# Patient Record
Sex: FEMALE | Race: BLACK OR AFRICAN AMERICAN | NOT HISPANIC OR LATINO | Employment: OTHER | URBAN - METROPOLITAN AREA
[De-identification: names, ages, dates, MRNs, and addresses within clinical notes are randomized per-mention and may not be internally consistent; named-entity substitution may affect disease eponyms.]

---

## 2017-06-30 ENCOUNTER — HISTORICAL (OUTPATIENT)
Dept: ADMINISTRATIVE | Facility: HOSPITAL | Age: 27
End: 2017-06-30

## 2017-06-30 LAB
BUN SERPL-MCNC: 6 MG/DL (ref 7–18)
CALCIUM SERPL-MCNC: 9 MG/DL (ref 8.5–10.1)
CHLORIDE SERPL-SCNC: 105 MMOL/L (ref 98–107)
CO2 SERPL-SCNC: 30 MMOL/L (ref 21–32)
CREAT SERPL-MCNC: 0.8 MG/DL (ref 0.6–1.3)
ERYTHROCYTE [DISTWIDTH] IN BLOOD BY AUTOMATED COUNT: 18.6 % (ref 11.5–14.5)
GLUCOSE SERPL-MCNC: 69 MG/DL (ref 74–106)
HCT VFR BLD AUTO: 32.8 % (ref 35–46)
HGB BLD-MCNC: 10.6 GM/DL (ref 12–16)
MCH RBC QN AUTO: 26.6 PG (ref 26–34)
MCHC RBC AUTO-ENTMCNC: 32.3 GM/DL (ref 31–37)
MCV RBC AUTO: 82.2 FL (ref 80–100)
PLATELET # BLD AUTO: 464 X10(3)/MCL (ref 130–400)
PMV BLD AUTO: 9.9 FL (ref 7.4–10.4)
POTASSIUM SERPL-SCNC: 3.9 MMOL/L (ref 3.5–5.1)
RBC # BLD AUTO: 3.99 X10(6)/MCL (ref 4–5.2)
SODIUM SERPL-SCNC: 142 MMOL/L (ref 136–145)
WBC # SPEC AUTO: 6.2 X10(3)/MCL (ref 4.5–11)

## 2017-07-07 ENCOUNTER — HISTORICAL (OUTPATIENT)
Dept: SURGERY | Facility: HOSPITAL | Age: 27
End: 2017-07-07

## 2017-07-07 LAB — B-HCG SERPL QL: NEGATIVE

## 2020-10-30 ENCOUNTER — HISTORICAL (OUTPATIENT)
Dept: ADMINISTRATIVE | Facility: HOSPITAL | Age: 30
End: 2020-10-30

## 2020-11-02 LAB — FINAL CULTURE: NORMAL

## 2021-11-20 ENCOUNTER — HOSPITAL ENCOUNTER (OUTPATIENT)
Dept: EMERGENCY MEDICINE | Facility: HOSPITAL | Age: 31
End: 2021-11-21
Attending: STUDENT IN AN ORGANIZED HEALTH CARE EDUCATION/TRAINING PROGRAM | Admitting: STUDENT IN AN ORGANIZED HEALTH CARE EDUCATION/TRAINING PROGRAM

## 2021-11-20 LAB
ABS NEUT (OLG): 4.61 X10(3)/MCL (ref 2.1–9.2)
ALBUMIN SERPL-MCNC: 3.7 GM/DL (ref 3.5–5)
ALBUMIN/GLOB SERPL: 0.8 RATIO (ref 1.1–2)
ALP SERPL-CCNC: 78 UNIT/L (ref 40–150)
ALT SERPL-CCNC: 9 UNIT/L (ref 0–55)
AST SERPL-CCNC: 20 UNIT/L (ref 5–34)
BASOPHILS # BLD AUTO: 0 X10(3)/MCL (ref 0–0.2)
BASOPHILS NFR BLD AUTO: 0 %
BILIRUB SERPL-MCNC: 0.1 MG/DL
BILIRUBIN DIRECT+TOT PNL SERPL-MCNC: 0 MG/DL (ref 0–0.8)
BILIRUBIN DIRECT+TOT PNL SERPL-MCNC: 0.1 MG/DL (ref 0–0.5)
BUN SERPL-MCNC: 3.9 MG/DL (ref 7–18.7)
CALCIUM SERPL-MCNC: 9.6 MG/DL (ref 8.7–10.5)
CHLORIDE SERPL-SCNC: 104 MMOL/L (ref 98–107)
CO2 SERPL-SCNC: 23 MMOL/L (ref 22–29)
CREAT SERPL-MCNC: 0.75 MG/DL (ref 0.55–1.02)
EOSINOPHIL # BLD AUTO: 0.1 X10(3)/MCL (ref 0–0.9)
EOSINOPHIL NFR BLD AUTO: 1 %
ERYTHROCYTE [DISTWIDTH] IN BLOOD BY AUTOMATED COUNT: 20.3 % (ref 11.5–14.5)
FERRITIN SERPL-MCNC: 6.17 NG/ML (ref 4.63–204)
GLOBULIN SER-MCNC: 4.6 GM/DL (ref 2.4–3.5)
GLUCOSE SERPL-MCNC: 96 MG/DL (ref 74–100)
HCT VFR BLD AUTO: 28.8 % (ref 35–46)
HGB BLD-MCNC: 9 GM/DL (ref 12–16)
IMM GRANULOCYTES # BLD AUTO: 0.02 10*3/UL
IMM GRANULOCYTES NFR BLD AUTO: 0 %
IRON SATN MFR SERPL: 5 % (ref 20–50)
IRON SERPL-MCNC: 18 UG/DL (ref 50–170)
LACTATE SERPL-SCNC: 1.4 MMOL/L (ref 0.5–2.2)
LYMPHOCYTES # BLD AUTO: 1.7 X10(3)/MCL (ref 0.6–4.6)
LYMPHOCYTES NFR BLD AUTO: 24 %
MCH RBC QN AUTO: 24.6 PG (ref 26–34)
MCHC RBC AUTO-ENTMCNC: 31.3 GM/DL (ref 31–37)
MCV RBC AUTO: 78.7 FL (ref 80–100)
MONOCYTES # BLD AUTO: 0.6 X10(3)/MCL (ref 0.1–1.3)
MONOCYTES NFR BLD AUTO: 8 %
NEUTROPHILS # BLD AUTO: 4.61 X10(3)/MCL (ref 2.1–9.2)
NEUTROPHILS NFR BLD AUTO: 67 %
NRBC BLD AUTO-RTO: 0 % (ref 0–0.2)
PLATELET # BLD AUTO: 463 X10(3)/MCL (ref 130–400)
PMV BLD AUTO: 9.9 FL (ref 7.4–10.4)
POTASSIUM SERPL-SCNC: 3.4 MMOL/L (ref 3.5–5.1)
PROT SERPL-MCNC: 8.3 GM/DL (ref 6.4–8.3)
RBC # BLD AUTO: 3.66 X10(6)/MCL (ref 4–5.2)
SARS-COV-2 AG RESP QL IA.RAPID: NEGATIVE
SODIUM SERPL-SCNC: 139 MMOL/L (ref 136–145)
T4 FREE SERPL-MCNC: 0.86 NG/DL (ref 0.7–1.48)
TIBC SERPL-MCNC: 321 UG/DL (ref 70–310)
TIBC SERPL-MCNC: 339 UG/DL (ref 250–450)
TRANSFERRIN SERPL-MCNC: 320 MG/DL (ref 180–382)
TSH SERPL-ACNC: 0.25 UIU/ML (ref 0.35–4.94)
WBC # SPEC AUTO: 6.9 X10(3)/MCL (ref 4.5–11)

## 2021-11-21 LAB
ABS NEUT (OLG): 6.09 X10(3)/MCL (ref 2.1–9.2)
ALBUMIN SERPL-MCNC: 3.6 GM/DL (ref 3.5–5)
ALBUMIN/GLOB SERPL: 0.7 RATIO (ref 1.1–2)
ALP SERPL-CCNC: 80 UNIT/L (ref 40–150)
ALT SERPL-CCNC: 7 UNIT/L (ref 0–55)
AMPHET UR QL SCN: NEGATIVE
APPEARANCE, UA: ABNORMAL
APTT PPP: 31 SECOND(S) (ref 23.3–37)
AST SERPL-CCNC: 13 UNIT/L (ref 5–34)
BACTERIA #/AREA URNS AUTO: ABNORMAL /HPF
BARBITURATE SCN PRESENT UR: NEGATIVE
BENZODIAZ UR QL SCN: NEGATIVE
BILIRUB SERPL-MCNC: 0.1 MG/DL
BILIRUB UR QL STRIP: ABNORMAL MG/DL
BILIRUBIN DIRECT+TOT PNL SERPL-MCNC: 0 MG/DL (ref 0–0.8)
BILIRUBIN DIRECT+TOT PNL SERPL-MCNC: 0.1 MG/DL (ref 0–0.5)
BUN SERPL-MCNC: 6.5 MG/DL (ref 7–18.7)
CALCIUM SERPL-MCNC: 10 MG/DL (ref 8.7–10.5)
CANNABINOIDS UR QL SCN: NEGATIVE
CHLORIDE SERPL-SCNC: 105 MMOL/L (ref 98–107)
CHOLEST SERPL-MCNC: 169 MG/DL
CHOLEST/HDLC SERPL: 4 {RATIO} (ref 0–5)
CO2 SERPL-SCNC: 24 MMOL/L (ref 22–29)
COCAINE UR QL SCN: NEGATIVE
COLOR UR: YELLOW
CREAT SERPL-MCNC: 0.72 MG/DL (ref 0.55–1.02)
CRP SERPL-MCNC: 1.53 MG/DL
ERYTHROCYTE [DISTWIDTH] IN BLOOD BY AUTOMATED COUNT: 20.1 % (ref 11.5–14.5)
ERYTHROCYTE [SEDIMENTATION RATE] IN BLOOD: 80 MM/HR (ref 0–20)
EST. AVERAGE GLUCOSE BLD GHB EST-MCNC: 91.1 MG/DL
FENTANYL UR QL SCN: NEGATIVE
GLOBULIN SER-MCNC: 5 GM/DL (ref 2.4–3.5)
GLUCOSE (UA): ABNORMAL MG/DL
GLUCOSE SERPL-MCNC: 126 MG/DL (ref 74–100)
HAV IGM SERPL QL IA: NONREACTIVE
HBA1C MFR BLD: 4.8 %
HBV CORE IGM SERPL QL IA: NONREACTIVE
HBV SURFACE AG SERPL QL IA: NONREACTIVE
HCT VFR BLD AUTO: 29.9 % (ref 35–46)
HCV AB SERPL QL IA: NONREACTIVE
HDLC SERPL-MCNC: 47 MG/DL (ref 35–60)
HGB BLD-MCNC: 9.5 GM/DL (ref 12–16)
HGB UR QL STRIP: ABNORMAL MG/DL
HIV 1+2 AB+HIV1 P24 AG SERPL QL IA: NONREACTIVE
HYALINE CASTS #/AREA URNS LPF: ABNORMAL /LPF
IMM GRANULOCYTES # BLD AUTO: 0.01 10*3/UL
IMM GRANULOCYTES NFR BLD AUTO: 0 %
INR PPP: 0.99 (ref 0.9–1.2)
KETONES UR QL STRIP: ABNORMAL MG/DL
LDLC SERPL CALC-MCNC: 96 MG/DL (ref 50–140)
LEUKOCYTE ESTERASE UR QL STRIP: 75 LEU/UL
LYMPHOCYTES # BLD AUTO: 0.8 X10(3)/MCL (ref 0.6–4.6)
LYMPHOCYTES NFR BLD AUTO: 12 %
MCH RBC QN AUTO: 24.7 PG (ref 26–34)
MCHC RBC AUTO-ENTMCNC: 31.8 GM/DL (ref 31–37)
MCV RBC AUTO: 77.7 FL (ref 80–100)
MDMA UR QL SCN: NEGATIVE
MONOCYTES # BLD AUTO: 0.1 X10(3)/MCL (ref 0.1–1.3)
MONOCYTES NFR BLD AUTO: 1 %
NEUTROPHILS # BLD AUTO: 6.09 X10(3)/MCL (ref 2.1–9.2)
NEUTROPHILS NFR BLD AUTO: 87 %
NITRITE UR QL STRIP: ABNORMAL
NRBC BLD AUTO-RTO: 0 % (ref 0–0.2)
OPIATES UR QL SCN: POSITIVE
PCP UR QL: NEGATIVE
PH UR STRIP.AUTO: 7.5 [PH] (ref 3–11)
PH UR STRIP: 7.5 [PH] (ref 4.5–8)
PLATELET # BLD AUTO: 554 X10(3)/MCL (ref 130–400)
PMV BLD AUTO: 9.8 FL (ref 7.4–10.4)
POTASSIUM SERPL-SCNC: 4.2 MMOL/L (ref 3.5–5.1)
PROT SERPL-MCNC: 8.6 GM/DL (ref 6.4–8.3)
PROT UR QL STRIP: 30 MG/DL
PROTHROMBIN TIME: 12.9 SECOND(S) (ref 11.9–14.4)
RBC # BLD AUTO: 3.85 X10(6)/MCL (ref 4–5.2)
RBC #/AREA URNS AUTO: ABNORMAL /HPF
SODIUM SERPL-SCNC: 137 MMOL/L (ref 136–145)
SP GR UR STRIP: 1.04 (ref 1–1.03)
SQUAMOUS #/AREA URNS LPF: >100 /LPF
T4 FREE SERPL-MCNC: 0.64 NG/DL (ref 0.7–1.48)
TRIGL SERPL-MCNC: 132 MG/DL (ref 37–140)
TSH SERPL-ACNC: 0.1 UIU/ML (ref 0.35–4.94)
UROBILINOGEN UR STRIP-ACNC: NORMAL
VLDLC SERPL CALC-MCNC: 26 MG/DL
WBC # SPEC AUTO: 7 X10(3)/MCL (ref 4.5–11)
WBC #/AREA URNS AUTO: ABNORMAL /HPF

## 2021-11-23 LAB — FINAL CULTURE: NO GROWTH

## 2021-11-25 LAB
FINAL CULTURE: NORMAL
FINAL CULTURE: NORMAL

## 2022-03-27 ENCOUNTER — HISTORICAL (OUTPATIENT)
Dept: ADMINISTRATIVE | Facility: HOSPITAL | Age: 32
End: 2022-03-27

## 2022-04-10 ENCOUNTER — HISTORICAL (OUTPATIENT)
Dept: ADMINISTRATIVE | Facility: HOSPITAL | Age: 32
End: 2022-04-10
Payer: MEDICARE

## 2022-04-15 ENCOUNTER — HISTORICAL (OUTPATIENT)
Dept: OPHTHALMOLOGY | Facility: CLINIC | Age: 32
End: 2022-04-15
Payer: MEDICARE

## 2022-04-26 VITALS
OXYGEN SATURATION: 98 % | HEIGHT: 64 IN | WEIGHT: 159.63 LBS | DIASTOLIC BLOOD PRESSURE: 82 MMHG | BODY MASS INDEX: 27.25 KG/M2 | SYSTOLIC BLOOD PRESSURE: 127 MMHG

## 2022-04-30 NOTE — H&P
* Final Report *  Document Contains Addenda  Freetext Note *     Patient:   Marie Pierce            MRN: [id number]            FIN: [id number]               Age:   27 years     Sex:  Female     :  1990   Associated Diagnoses:   None   Author:   Soren Pedroza MD have reviewed the patients H&P and clinic notes and spoken with the patient. There have been no interval changes to her health or status. The produre including the r/b/a were again discussed with the patient. Consent was signed in clinic less than one month ago and is in the chart. Ok to proceed with operation today.     Soren Pedroza MD  [facility], PGY-1      Addendum by Thee Rivera MD on 2017 10:11 CDT (Verified)  No changes. Suprapubic hidradenitis. To OR for excision.    Result type: Progress Note-Physician  Result date: 2017 6:15 CDT  Result status: Modified  Result title: Freetext Note *  Performed by: Soren Pedroza MD on 2017 6:17 CDT  Verified by: Soren Pedroza MD on 2017 6:17 CDT  Encounter info: [id number], [facility], Day Surgery, 2017 - 2017    Doc has been moved by HIM specialist to the correct doc type.

## 2022-04-30 NOTE — H&P
* Final Report *   Document Contains Addenda  Chief Complaint REFERRED FOR HIDRADENITIS SUPPURATIVA LT GROIN AND BUTTOCKS History of Present Illness   Today she presents to surgery clinic complaining of significant pain and discomfort, worst in suprapubic area  She reports that one of the stitches popped this morning while moving.  No purulent drainage or erythema present.    She denies fever, chills, nausea, vomiting, constipation, or diarrhea.    Review of Systems  Negative other than as stated in HPI    Objective  Vitals & Measurements  T: 36.4 °C (Oral) HR: 91 (Apical) RR: 18 BP: 95/59 WT: 66 kg WT: 66 kg     Physical Exam  Uncomfortable appearing AAF  RRR  CTAB  Abd soft, nt, nd  Bilateral groin incisions  Suprapubic area-left of midline 3cm area, needs excision, chronic scar area    Assessment/Plan  26 F with hidradenitis of groin, suprapubic area  1) excision on 7/7 Problem List/Past Medical History Abscess of buttock, right Abscess, recurrent Hydradenitis Tobacco user Tobacco user Historical   No historical problems Procedure/Surgical History Drainage of Genitalia Skin, External Approach (05/26/2017), Incision and drainage of abscess (eg, carbuncle, suppurative hidradenitis, cutaneous or subcutaneous abscess, cyst, furuncle, or paronychia); simple or single (05/26/2017), Excision Hydradenitis (Right) (10/28/2016), Excision of Pelvic Region Subcutaneous Tissue and Fascia, Open Approach (10/28/2016), Excision of skin and subcutaneous tissue for hidradenitis, inguinal; with simple or intermediate repair (10/28/2016), Excision of Right Upper Arm Subcutaneous Tissue and Fascia, Open Approach (08/26/2016), Excision of skin and subcutaneous tissue for hidradenitis, axillary; with simple or intermediate repair (08/26/2016), Emily procedure axilla (Right) (08/26/2016), Excision Hydradenitis (Right) (07/22/2016), Excision of Right Upper Arm Subcutaneous Tissue and Fascia, Open Approach (07/22/2016), Excision of skin  and subcutaneous tissue for hidradenitis, axillary; with simple or intermediate repair (2016), Excision Hydradenitis (Left) (2016), Excision of Pelvic Region Subcutaneous Tissue and Fascia, Open Approach (2016), Excision of skin and subcutaneous tissue for hidradenitis, inguinal; with simple or intermediate repair (2016), Dilation & Curettage (.) (2016), Extraction of Products of Conception, Retained, Via Natural or Artificial Opening (2016), Treatment of incomplete , any trimester, completed surgically (2016), Drainage of Left Axilla, Percutaneous Approach, Diagnostic (2015), Drainage of Left Upper Arm Skin, External Approach (2015), Excision Hydradenitis (None) (2015), Excision of Left Upper Arm Subcutaneous Tissue and Fascia, Open Approach (2015), Excision of skin and subcutaneous tissue for hidradenitis, axillary; with simple or intermediate repair (2015), Drainage of Chest Skin, External Approach (10/13/2015), Incision and drainage of abscess (eg, carbuncle, suppurative hidradenitis, cutaneous or subcutaneous abscess, cyst, furuncle, or paronychia); complicated or multiple (10/13/2015), Incision and drainage of abscess (eg, carbuncle, suppurative hidradenitis, cutaneous or subcutaneous abscess, cyst, furuncle, or paronychia); simple or single. (2013), Other incision with drainage of skin and subcutaneous tissue (2013), Incision and drainage of abscess (eg, carbuncle, suppurative hidradenitis, cutaneous or subcutaneous abscess, cyst, furuncle, or paronychia); simple or single. (2013), Other incision with drainage of skin and subcutaneous tissue (2013). Medications CLINDAMYCIN  MG CAPSULE Percocet 5/325, 2 tab(s), Oral, Once Percocet 5/325 oral tablet, 1 tab(s), Oral, q4hr, PRN Zofran 4 mg oral tablet, 4 mg, 1 tab(s), Oral, q8hr, PRN Allergies traMADol Social History   Alcohol - Low Risk   Never   Past,  1-2 times per year   Employment/School   Unemployed   Nutrition/Health   Regular   Substance Abuse - Denies Substance Abuse   Never   Never   Tobacco - Medium Risk   Current every day smoker, Cigarettes, 10 per day.   Current every day smoker, Cigarettes   Current every day smoker   Current every day smoker, Cigarettes   Current every day smoker, Cigarettes   Current every day smoker, Cigarettes, 10 per day. 6 year(s). Ready to change: No.    [1] Office Visit Note; Rizwana Walker 11/02/2016 12:04 CDT  Addendum by Kishore Lobo MD on Kayla 15, 2017 14:23:15 CDT (Verified)  I reviewed the patient's medical history, resident's findings on physical exam, the diagnosis and treatment plan. Care provided was reasonable and necessary.  Result type: Office/Clinic Note-Physician   Result date: June 13, 2017 16:37 CDT   Result status: Modified   Result title: Surgery Clinic Note   Performed by: Jeffrey Ley MD on June 13, 2017 16:40 CDT   Verified by: Jeffrey Ley MD on June 13, 2017 16:40 CDT   Encounter info: [id number], [facility], Clinic Visit, 6/13/2017 - 6/13/2017   Doc has been moved by HIM specialist.

## 2022-05-03 NOTE — HISTORICAL [facility] CERNER
This is a historical note converted from Brandt. Formatting and pictures may have been removed.  Please reference Certammy for original formatting and attached multimedia. Indication for Surgery  27 year-old female with a past medical history of bilateral axillary and groin hidradenitis, here today for excision of suprapubic excision.  Preoperative Diagnosis  Hidradenitis  Postoperative Diagnosis  Hidradenitis  Operation  Hidradenitis Excision  Surgeon(s)  Soren Pedroza M.D.  Assistant  Jessy Mike M.D.  ?   Staff Surgeon  Thee Rivera M.D.  Anesthesia  General  Estimated Blood Loss  5cc  Urine Output  None  Specimen(s)  Suprapubic hidradenitis of skin and soft tissue  Technique  The patient was consented for the procedure. The risks, benefits, and alternatives to the procedure were discussed. The patient was taken to the operating room and prepped and draped in the usual sterile?fashion. ?Time out was performed that correctly identified patient, operation, location, and surgeon. The patient was placed in the supine position in a slight frog-leg position. A marking pen was then used to trace an elliptical incision on both the right and left suprapubic regions of active diseased tissue to be excised. A #10 blade was then used to excise an approximately 6x3cm on the right and 8x5cm on the left elliptical shaped areas of diseased tissue. Bovie cauterization was then used to incise down below the dermis and remove the affected tissue. Sufficient hemostasis was achieved using Bovie cauterization. The excision sites were then copiously irrigated with warm saline.  ?  The edges of the wound were underscored approximately 0.5cm to allow for better tissue approximation. Both excision sites had deep dermal sutures placed with 2-0 Vicryl. The sites?were then approximated and closed using 2-0 Nylon simple and vertical mattress sutures. The incision sites were? dressed with gauze, abd pad and tape.  ?  The patient  tolerated the procedure well and left the OR in stable condition. Dr. Rivera was present during all critical portions of this operation. All counts were correct at the end of the case.

## 2022-05-05 ENCOUNTER — TELEPHONE (OUTPATIENT)
Dept: OPHTHALMOLOGY | Facility: CLINIC | Age: 32
End: 2022-05-05
Payer: MEDICARE

## 2022-05-05 NOTE — TELEPHONE ENCOUNTER
Called patient to see how she was doing as she had missed her last appointment. Went to voicemail and left message instructing patient to call clinic back as soon as possible.

## 2022-05-20 NOTE — HISTORICAL [facility] CERNER
This is a historical note converted from Brandt. Formatting and pictures may have been removed.  Please reference Brandt for original formatting and attached multimedia. History of Present Illness  POD#1 PKP OS for severe corneal ulcer.  Physical Exam  Va?(near)  OD 20/20  OS?20/200  ?  IOP OS?--?no read on [place], soft to palpation  ?  SLE  LL - normal//moderate upper and lower lid edema  CS - white and quiet//diffuse 2-3+ injection, subconjunctival hemorrhage  K - clear//PKP graft in position (though flat)?with 8?simple interrupted sutures and running suture in place; 6 mm nasally centered corneal abrasion; graft edema with Descemet folds;?very?trace leakage from temporal aspect of wound  AC - deep and quiet//heme across iris from 11 oclock to 4 oclock, view hazy 2/2 corneal abrasion and graft edema  I - round and reactive//remnants of fibrotic membrane around pupil, inferotemporal surgical PI OS  L - clear//clear with hazy view from fibrotic membrane/heme  Vit- clear//poor view  ?  DFE?3/27/22  N - pink and sharp, C/D 0.3  V - WNL  M - flat, good FLR?  P - temporal commotio retinae without evident holes/tears/detachments//normal without evident holes/tears/detachments; limited views of periphery OS due to obstructed view from corneal ulcer  ?   B-scan 4/11/22: retina flat, no vitreous opacities noted  ?   Assessment/Plan  Corneal ulcer with hypopyon, left eye?H16.032  1.?Corneal?ulcer, left eye, with hypopyon  2. Postoperative eye state  - History of topical anesthetic use (topical tetracaine)?and inferonasal location with rolled edges suspicious for neurotrophic ulcer, which appears to have become secondarily infected. Denies contact lens use.  - Initial corneal culture 3/27 NGTD. Corneal cultures obtained again 4/11/22 (aerobic/anaerobic, fungal, KOH, Gram stain, mycobacteria), NGTD  - Patient returned to ED 4/11/22 for worsening pain of left eye. Admitted for worsening corneal ulcer.  -?PKP of left cornea  performed 4/14/22 for impending perforation of corneal ulcer  - Host corneal specimen sent to pathology with additional testing for fungal elements  - Fortified?vancomycin drops q2 hour while awake, left eye -- alternate every hour with tobramycin  - Fortified?tobramycin drops q2 hour while awake, left eye -- alternate every hour with vancomycin  -?Pred Forte QID left eye (shake bottle well)  - Hold natamycin for now  - Oral pain medication as needed  - Eye shield AT ALL TIMES except when administering drops. Patient informed that she should not rub eye.  - No topical anesthetic use  - Will reassess in clinic Monday 4/18/22  - Written instructions given to patient  Assessment/Plan  Orders:  Pathology Tissue [facility], 04/14/22 16:19:00 CDT, AP Specimen, Corneal ulcer, left eye, requiring therapeutic penetrating keratoplasty. Concern for fungal etiology of ulcer. Please assess corneal specimen for fungal elements., Corneal ulcer, left eye, Cornea, left eye, Now, Col...   Problem List/Past Medical History  Ongoing  Abscess  Abscess of buttock, right  Abscess, recurrent  Hydradenitis  Hydradenitis  Knowledge deficit  Tobacco user  Tobacco user  Historical  No qualifying data  Procedure/Surgical History  Corneal Transplant (Left) (04/14/2022)  Drainage of Lower Gingiva, External Approach (01/25/2022)  Puncture aspiration of abscess, hematoma, bulla, or cyst (01/25/2022)  Drainage of Face Skin, External Approach (01/24/2022)  Incision and drainage of abscess (eg, carbuncle, suppurative hidradenitis, cutaneous or subcutaneous abscess, cyst, furuncle, or paronychia); simple or single (01/24/2022)  Introduction of Anesthetic Agent into Peripheral Nerves and Plexi, Percutaneous Approach (11/24/2021)  Drainage of Vulva, External Approach (10/20/2020)  Incision and drainage of vulva or perineal abscess (10/20/2020)  Puncture aspiration of abscess, hematoma, bulla, or cyst (02/01/2020)  Drainage of Pelvic Region Subcutaneous Tissue  and Fascia, Open Approach (04/14/2019)  Incision and drainage of abscess (eg, carbuncle, suppurative hidradenitis, cutaneous or subcutaneous abscess, cyst, furuncle, or paronychia); simple or single (04/14/2019)  Incision and drainage of abscess (eg, carbuncle, suppurative hidradenitis, cutaneous or subcutaneous abscess, cyst, furuncle, or paronychia); simple or single (04/09/2019)  Drainage of Left Upper Leg Skin, External Approach (03/16/2019)  Puncture aspiration of abscess, hematoma, bulla, or cyst (03/16/2019)  Drainage of Left Axilla, Percutaneous Approach (02/08/2019)  Puncture aspiration of abscess, hematoma, bulla, or cyst (02/08/2019)  Incision and drainage of abscess (eg, carbuncle, suppurative hidradenitis, cutaneous or subcutaneous abscess, cyst, furuncle, or paronychia); complicated or multiple (01/31/2019)  Drainage of Buttock Subcutaneous Tissue and Fascia, Open Approach (07/14/2018)  Incision and drainage of abscess (eg, carbuncle, suppurative hidradenitis, cutaneous or subcutaneous abscess, cyst, furuncle, or paronychia); complicated or multiple (07/14/2018)  Drainage of Inguinal Skin, External Approach (06/12/2018)  Incision and drainage of abscess (eg, carbuncle, suppurative hidradenitis, cutaneous or subcutaneous abscess, cyst, furuncle, or paronychia); complicated or multiple (06/12/2018)  Drainage of Inguinal Skin, External Approach (05/01/2018)  Incision and drainage of abscess (eg, carbuncle, suppurative hidradenitis, cutaneous or subcutaneous abscess, cyst, furuncle, or paronychia); complicated or multiple (05/01/2018)  Drainage of Inguinal Skin, External Approach (01/29/2018)  Incision and drainage of abscess (eg, carbuncle, suppurative hidradenitis, cutaneous or subcutaneous abscess, cyst, furuncle, or paronychia); simple or single (01/29/2018)  Excision Hydradenitis (None) (07/07/2017)  Excision of Pelvic Region Subcutaneous Tissue and Fascia, Open Approach (07/07/2017)  Excision of skin  and subcutaneous tissue for hidradenitis, inguinal; with simple or intermediate repair (2017)  Drainage of Genitalia Skin, External Approach (2017)  Incision and drainage of abscess (eg, carbuncle, suppurative hidradenitis, cutaneous or subcutaneous abscess, cyst, furuncle, or paronychia); simple or single (2017)  Excision Hydradenitis (Right) (10/28/2016)  Excision of Pelvic Region Subcutaneous Tissue and Fascia, Open Approach (10/28/2016)  Excision of skin and subcutaneous tissue for hidradenitis, inguinal; with simple or intermediate repair (10/28/2016)  Excision of Right Upper Arm Subcutaneous Tissue and Fascia, Open Approach (2016)  Excision of skin and subcutaneous tissue for hidradenitis, axillary; with simple or intermediate repair (2016)  [place] procedure axilla (Right) (2016)  Excision Hydradenitis (Right) (2016)  Excision of Right Upper Arm Subcutaneous Tissue and Fascia, Open Approach (2016)  Excision of skin and subcutaneous tissue for hidradenitis, axillary; with simple or intermediate repair (2016)  Excision Hydradenitis (Left) (2016)  Excision of Pelvic Region Subcutaneous Tissue and Fascia, Open Approach (2016)  Excision of skin and subcutaneous tissue for hidradenitis, inguinal; with simple or intermediate repair (2016)  Dilation & Curettage (.) (2016)  Extraction of Products of Conception, Retained, Via Natural or Artificial Opening (2016)  Treatment of incomplete , any trimester, completed surgically (2016)  Drainage of Left Axilla, Percutaneous Approach, Diagnostic (2015)  Drainage of Left Upper Arm Skin, External Approach (2015)  Excision Hydradenitis (None) (2015)  Excision of Left Upper Arm Subcutaneous Tissue and Fascia, Open Approach (2015)  Excision of skin and subcutaneous tissue for hidradenitis, axillary; with simple or intermediate repair (2015)  Drainage of  Chest Skin, External Approach (10/13/2015)  Incision and drainage of abscess (eg, carbuncle, suppurative hidradenitis, cutaneous or subcutaneous abscess, cyst, furuncle, or paronychia); complicated or multiple (10/13/2015)  Incision and drainage of abscess (eg, carbuncle, suppurative hidradenitis, cutaneous or subcutaneous abscess, cyst, furuncle, or paronychia); simple or single. (05/01/2013)  Other incision with drainage of skin and subcutaneous tissue (05/01/2013)  Incision and drainage of abscess (eg, carbuncle, suppurative hidradenitis, cutaneous or subcutaneous abscess, cyst, furuncle, or paronychia); simple or single. (03/28/2013)  Other incision with drainage of skin and subcutaneous tissue (03/28/2013)   Medications  Benadryl (for Inj.), 50 mg= 1 mL, IV Push, Once  diclofenac sodium 75 mg oral delayed release tablet, 75 mg= 1 tab(s), Oral, BID, PRN,? ?Not taking  erythromycin ophthalmic 0.5% ointment (NSY/Pyxis), 0.5 in, Eye-Left, q2hr, 1 refills,? ?Not Taking per Prescriber  lidocaine 1% injectable solution, 5 mL, Subcutaneous, Once  nabumetone 500 mg oral tablet, 500 mg= 1 tab(s), Oral, BID, PRN,? ?Not taking  natamycin 5% ophthalmic suspension, 1 drop, Eye-Left, q2hr,? ?Not Taking per Prescriber  natamycin 5% ophthalmic suspension, 1 drop, Eye-Left, q2hr  Norco 5 mg-325 mg oral tablet, 1 tab(s), Oral, Once-NOW  Percocet 5/325, 2 tab(s), Oral, Once  Tobramycin Fortified Ophth Soln 15 mg/mL-7.4 mL, 1 drop, Eye-Left, q1hr  Toradol, 30 mg= 1 mL, IV Push, Once  Tylenol Extra Strength  mg-25 mg oral tablet, 2 tab(s), Oral, Once a day (at bedtime), PRN,? ?Not taking  vancomycin 500 mg intravenous injection, 500 mg= 1 drop(s), Eye-Left, q1hr  Vitamin C 500 mg oral tablet, 1000 mg= 2 tab(s), Oral, BID  Allergies  Contrast Dye?(Hives)  traMADol?(hives, itching)  Social History  Abuse/Neglect  No, 04/12/2022  Alcohol - Low Risk, 06/25/2014  Past, 11/19/2021  Employment/School  Unemployed,  12/08/2015  Nutrition/Health  Regular, 01/12/2016  Substance Use - Denies Substance Abuse, 03/28/2013  Never, 01/09/2022  Tobacco - Medium Risk, 03/28/2013  10 or more cigarettes (1/2 pack or more)/day in last 30 days, No, 04/12/2022  Family History  Family history is negative  Health Maintenance  Health Maintenance  ???Pending?(in the next year)  ??? ??OverDue  ??? ? ? ?Cervical Cancer Screening due??12/10/17??and every 3??year(s)  ??? ? ? ?Depression Screening due??08/01/18??and every 1??year(s)  ??? ? ? ?Smoking Cessation due??01/01/22??and every 1??year(s)  ??? ? ? ?Alcohol Misuse Screening due??01/02/22??and every 1??year(s)  ??? ??Due?  ??? ? ? ?ADL Screening due??04/15/22??and every 1??year(s)  ??? ? ? ?Medicare Annual Wellness Exam due??04/15/22??and every 1??year(s)  ??? ? ? ?Tetanus Vaccine due??04/15/22??and every 10??year(s)  ??? ??Due In Future?  ??? ? ? ?Obesity Screening not due until??01/01/23??and every 1??year(s)  ??? ? ? ?Body Mass Index Check not due until??04/13/23??and every 1??year(s)  ??? ? ? ?Blood Pressure Screening not due until??04/14/23??and every 1??year(s)  ???Satisfied?(in the past 1 year)  ??? ??Satisfied?  ??? ? ? ?Blood Pressure Screening on??04/14/22.??Satisfied by Anh Lundberg RN  ??? ? ? ?Body Mass Index Check on??04/13/22.??Satisfied by Renny MS, RDN, TIAGON, Yanique PAYNE  ??? ? ? ?Diabetes Screening on??01/06/22.??Satisfied by Sonia Ramirez MT  ??? ? ? ?Influenza Vaccine on??11/23/21.??Satisfied by Jay BAEZ, Fanny Sloan  ??? ? ? ?Lipid Screening on??11/21/21.??Satisfied by Fara Tubbs  ??? ? ? ?Obesity Screening on??04/13/22.??Satisfied by Renny MS, RDN, TIAGONYanique  ??? ? ? ?Smoking Cessation on??11/25/21.??Satisfied by Chaya Chacon RN  ?

## 2022-06-07 ENCOUNTER — TELEPHONE (OUTPATIENT)
Dept: OPHTHALMOLOGY | Facility: CLINIC | Age: 32
End: 2022-06-07
Payer: MEDICARE

## 2022-06-07 NOTE — TELEPHONE ENCOUNTER
----- Message from Saumya Upton sent at 6/7/2022 12:37 PM CDT -----  Regarding: drops  Patient said she was in clinic and received drops one with  and white cap she is out and would like to know is she can get refill     06/07/2022 4:11 PM  Spoke with Dr. Aguilar he wants to see patient tomorrow. He will not refill any meds until we see her tomorrow. Ritu made her an appointment 1:45pm.

## 2022-06-08 ENCOUNTER — OFFICE VISIT (OUTPATIENT)
Dept: OPHTHALMOLOGY | Facility: CLINIC | Age: 32
End: 2022-06-08
Payer: MEDICARE

## 2022-06-08 VITALS — BODY MASS INDEX: 28.17 KG/M2 | HEIGHT: 64 IN | WEIGHT: 165 LBS

## 2022-06-08 DIAGNOSIS — H16.003 CORNEAL ULCER OF BOTH EYES: Primary | ICD-10-CM

## 2022-06-08 PROCEDURE — 99212 OFFICE O/P EST SF 10 MIN: CPT | Mod: PBBFAC,PO | Performed by: STUDENT IN AN ORGANIZED HEALTH CARE EDUCATION/TRAINING PROGRAM

## 2022-06-08 RX ORDER — ACETAMINOPHEN AND DIPHENHYDRAMINE HYDROCHLORIDE 500; 25 MG/1; MG/1
TABLET, FILM COATED ORAL
COMMUNITY
Start: 2022-01-09

## 2022-06-08 RX ORDER — NABUMETONE 500 MG/1
500 TABLET, FILM COATED ORAL 2 TIMES DAILY PRN
COMMUNITY
Start: 2022-01-09

## 2022-06-08 NOTE — PROGRESS NOTES
"HPI     Follow-up      Additional comments: Patient presents to [facility] eye clinic corneal ulcer   check. Patient states, " when I wake up in the morning I can feel and see   sutures in OS".           Last edited by Malu Pearson LPN on 6/8/2022  1:50 PM. (History)      Assessment /Plan     For exam results, see Encounter Report.    Corneal ulcer of both eyes      Patient left without being seen by MD. Vision appears to still be decreased. Patient in hurry with other obligations. Patient wished to be called to schedule appoint and was not able to wait for one to be schedueld before leaving. Will reach out to patient to schedule follow up ASAP.     Last note attached below for record keeping    Last SLE  LL - normal//moderate upper and lower lid edema  CS - white and quiet//diffuse 1-2+ injection, subconjunctival hemorrhage  K - clear//PKP graft in position (though flat) with 8 simple interrupted sutures and running suture in place; 6 mm nasally centered corneal abrasion; significant graft edema with diffuse Descemet folds; white precipitates located along superotemporal sutures (total of 7-8); Wilma negative  AC - deep and quiet//very shallow, view hazy 2/2 corneal abrasion and graft edema  I - round and reactive//remnants of fibrotic membrane around pupil, inferotemporal surgical PI OS  L - clear//clear with hazy view from fibrotic membrane/heme  Vit- clear//poor view    Last DFE 3/27/22  N - pink and sharp, C/D 0.3  V - WNL  M - flat, good FLR   P - temporal commotio retinae without evident holes/tears/detachments//normal without evident holes/tears/detachments; limited views of periphery OS due to obstructed view from corneal ulcer    B-scan 4/11/22: retina flat, no vitreous opacities noted      1. Corneal ulcer, left eye, with hypopyon  2. Postoperative eye state  - History of topical anesthetic use (topical tetracaine) and inferonasal location with rolled edges suspicious for neurotrophic ulcer, which appears to " have become secondarily infected. Denies contact lens use.  - Initial corneal culture 3/27 NG. Corneal cultures obtained again 4/11/22 (aerobic/anaerobic, fungal, KOH, Gram stain, mycobacteria), NG  - Patient returned to ED 4/11/22 for worsening pain of left eye. Admitted for worsening corneal ulcer.  - PKP of left cornea performed 4/14/22 for impending perforation of corneal ulcer  - Host corneal specimen sent to pathology with additional testing for fungal elements  - Wilma negative 4/18/22, but with low IOP and very shallow AC  - Bandage contact lens placed 4/18/22  - Fortified vancomycin drops q2 hour while awake, left eye -- alternate every hour with tobramycin  - Fortified tobramycin drops q2 hour while awake, left eye -- alternate every hour with vancomycin  - Pred Forte QID left eye (shake bottle well)  - Hold natamycin for now  - Oral pain medication as needed  - Eye shield AT ALL TIMES except when administering drops. Patient informed that she should not rub eye.  - No topical anesthetic use  - Written instructions given to patient    RTC 4/21/22 or 4/22/22 for K check

## 2022-06-08 NOTE — ADDENDUM NOTE
Addended by: CARIN THOMAS on: 6/8/2022 03:11 PM     Modules accepted: Level of Service, SmartSet

## 2022-06-10 PROBLEM — H16.003 CORNEAL ULCER OF BOTH EYES: Status: ACTIVE | Noted: 2022-06-10

## 2022-06-29 ENCOUNTER — CLINICAL SUPPORT (OUTPATIENT)
Dept: OPHTHALMOLOGY | Facility: CLINIC | Age: 32
End: 2022-06-29
Payer: MEDICARE

## 2022-06-29 VITALS — HEIGHT: 64 IN | BODY MASS INDEX: 28.32 KG/M2

## 2022-06-29 DIAGNOSIS — H16.012 CENTRAL CORNEAL ULCER, LEFT EYE: ICD-10-CM

## 2022-06-29 DIAGNOSIS — Z98.890 POSTOPERATIVE EYE STATE: Primary | ICD-10-CM

## 2022-06-29 PROCEDURE — 99213 OFFICE O/P EST LOW 20 MIN: CPT | Mod: PBBFAC,PO | Performed by: STUDENT IN AN ORGANIZED HEALTH CARE EDUCATION/TRAINING PROGRAM

## 2022-06-29 RX ORDER — MOXIFLOXACIN 5 MG/ML
1 SOLUTION/ DROPS OPHTHALMIC 4 TIMES DAILY
Qty: 3 ML | Refills: 0 | Status: SHIPPED | OUTPATIENT
Start: 2022-06-29 | End: 2022-06-29

## 2022-06-29 RX ORDER — MOXIFLOXACIN 5 MG/ML
1 SOLUTION/ DROPS OPHTHALMIC
Qty: 3 ML | Refills: 0 | Status: SHIPPED | OUTPATIENT
Start: 2022-06-29 | End: 2022-07-13

## 2022-06-29 NOTE — PROGRESS NOTES
HPI     Post-op Evaluation      Additional comments: POW1- Pt states had sx before Easter but has not   been coming to all of her post op appointments. States has had a lot going   on in her life and has not been able to come. Wants to resume care.           Last edited by Yanique Cassidy RN on 6/29/2022  2:34 PM. (History)      Assessment /Plan     For exam results, see Encounter Report.    Postoperative eye state    Central corneal ulcer, left eye    Other orders  -     moxifloxacin (VIGAMOX) 0.5 % ophthalmic solution; Place 1 drop into both eyes 4 (four) times daily. for 7 days  Dispense: 3 mL; Refill: 0              B-scan   06/29/22: retina flat, no vitreous opacities noted     1. Corneal ulcer, left eye, with hypopyon  2. 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 Denies contact lens use.  - Initial corneal culture 3/27 NG. Corneal cultures obtained again 4/11/22 (aerobic/anaerobic, fungal, KOH, Gram stain, mycobacteria), NG  - Patient returned to ED 4/11/22 for worsening pain of left eye. Admitted for worsening corneal ulcer.  - PKP of left cornea performed 4/14/22 for impending perforation of corneal ulcer  - Host corneal specimen sent to pathology with additional testing for fungal elements  - Wilma negative 4/18/22, but with low IOP and very shallow AC  - Bandage contact lens placed 4/18/22  - is on fortified vancomycin and tobramycin q2hr while awake left eye and PF q.i.d. left eye as well.  Patient has not followed up since mid April. She has not taken any medication eye drops for over a month.   Has had multiple appointments where patient either no showed or left before being seen by an MD.  Patient presents 6/29/22 emergently for concern of left eye.  She was found to have numerous loose sutures, failed graft, shallow AC,  neovascularization to multiple loose sutures.  Offered to remove loose sutures to prevent infection, further neovascularization, and decreasd irritation.  Patient was upset by possible poor prognosis and did not wish to have a needle close to her eye.  She had refused suture removal.  Discussed risks and benefits of suture removal to include infection and irritation prevention.  Opportunity for questions provided.  All questions answered.  Patient did not wish to continue with suture removal.  Discussed placing prokera to promote preservation of cornea status.  Patient did not wish to wait for this to be done.  Again discussed risks and benefits with patient.  Patient opted to reschedule for 6/30/22 for placement of the graft.  Discussed importance of adherence to eye drops and followups.  - start vigamox 6x per day os  - No topical anesthetic use

## 2022-06-30 ENCOUNTER — OFFICE VISIT (OUTPATIENT)
Dept: OPHTHALMOLOGY | Facility: CLINIC | Age: 32
End: 2022-06-30
Payer: MEDICARE

## 2022-06-30 VITALS — HEIGHT: 64 IN | WEIGHT: 165 LBS | BODY MASS INDEX: 28.17 KG/M2

## 2022-06-30 DIAGNOSIS — H16.012 CENTRAL CORNEAL ULCER, LEFT EYE: Primary | ICD-10-CM

## 2022-06-30 PROCEDURE — 65778 COVER EYE W/MEMBRANE: CPT | Mod: PBBFAC,PO | Performed by: STUDENT IN AN ORGANIZED HEALTH CARE EDUCATION/TRAINING PROGRAM

## 2022-06-30 PROCEDURE — 99213 OFFICE O/P EST LOW 20 MIN: CPT | Mod: PBBFAC,PO | Performed by: STUDENT IN AN ORGANIZED HEALTH CARE EDUCATION/TRAINING PROGRAM

## 2022-06-30 NOTE — PROGRESS NOTES
HPI     Follow-up      Additional comments: K check; prokera          Last edited by Yanique Cassidy RN on 6/30/2022  9:02 AM. (History)      Assessment /Plan     For exam results, see Encounter Report.    Central corneal ulcer, left eye  -     Amniotic Membrane Graft, Self-Retaining - OS - Left Eye; Future          B-scan   06/29/22: retina flat, no vitreous opacities noted     1. Corneal ulcer, left eye, with hypopyon  2. 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 Denies contact lens use.  - Initial corneal culture 3/27 NG. Corneal cultures obtained again 4/11/22 (aerobic/anaerobic, fungal, KOH, Gram stain, mycobacteria), NG  - Patient returned to ED 4/11/22 for worsening pain of left eye. Admitted for worsening corneal ulcer.  - PKP of left cornea performed 4/14/22 for impending perforation of corneal ulcer  - Host corneal specimen sent to pathology with additional testing for fungal elements  - Wilma negative 4/18/22, but with low IOP and very shallow AC  - Bandage contact lens placed 4/18/22  - patient was on fortified vancomycin and tobramycin q2hr while awake left eye and PF q.i.d. left eye as well.  Patient has not followed up since mid April. She has not taken any medication eye drops for over a month.   Has had multiple appointments where patient either no showed or left before being seen by an MD.  Patient presents 6/29/22 emergently for concern of left eye.  She was found to have numerous loose sutures, failed graft, shallow AC, neovascularization to multiple loose sutures.  Offered to remove loose sutures to prevent infection, further neovascularization, and decreasd irritation.  Patient was upset by possible poor prognosis and did not wish to have a needle close to her eye.  She had refused suture removal.  Discussed risks and  benefits of suture removal to include infection and irritation prevention.  Opportunity for questions provided.  All questions answered.  Patient did not wish to continue with suture removal.  Discussed placing prokera to promote preservation of cornea status.  Patient did not wish to wait for this to be done.  Again discussed risks and benefits with patient.  Patient opted to reschedule for 6/30/22 for placement of the graft.  Discussed importance of adherence to eye drops and followups.  - 6/30/22: discussed rba of prokera. Patient wished to proceed with prokera placement. prokera placed left eye. Patient re-presented to clinic same day with prokera in hand noting it fell out. Collagen shield and hard shield placed over area. Instructed patient strictly no rubbing or taking shield off. Discussed importance of starting antibiotic drops. Patient was not sure where we had sent them.    - start vigamox 6x per day os  - follow up Monday in ED with Dr. Cruz for collagen shield replacement   - No topical anesthetic use      procedure notes  Procedure: prokera placement, left eye  Risks, benefits, and alternatives of procedure were discussed.  Patient voiced understanding, all questions were answered, and the patient elected to proceed with procedure.  Informed consent was obtained. A time-out was performed confirming correct patient, procedure, and site. The eye was anesthetized with topical proparacaine, 0.5%. Amniotic membrane graft was rinsed with BSS. Placed on left eye in good location.     Prokera product information  PRODUCT IDENTIFIER - [id number]   - [id number]  EXP DATE - 10-  DONOR ID - [id number]    Patient unable to keep Prokera graft in place Dr. Badillo replaced with a Collagen Shield    Procedure: collagen shield placement, left eye  Risks, benefits, and alternatives of procedure were discussed.  Patient voiced understanding, all questions were answered, and the patient  elected to proceed with procedure.  Informed consent was obtained. A time-out was performed confirming correct patient, procedure, and site. The eye was anesthetized with topical proparacaine, 0.5%. Collagen shield was reconstituted with BSS. Placed on left eye in good location.     Soft Shield-Collagen Shield  Product information  REF# [id number]  #[id number]  LOT# [id number]  Exp. Date 2/17/2024

## 2022-06-30 NOTE — LETTER
June 30, 2022      [facility]  [address] [place] [address]  Phone: [phone]       Patient: Marie Pierce   YOB: 1990  Date of Visit: 06/30/2022    To Whom It May Concern:    Suzette Pierce  was at [facility] on 06/30/2022 having a procedure. The patient may return to work/school on 7/7/22 with no heavy lifting, bending or straining. If you have any questions or concerns, or if I can be of further assistance, please do not hesitate to contact me.    Sincerely,    Stacie Elizondo RN

## 2022-07-04 ENCOUNTER — TELEPHONE (OUTPATIENT)
Dept: OPHTHALMOLOGY | Facility: CLINIC | Age: 32
End: 2022-07-04
Payer: MEDICARE

## 2022-07-04 ENCOUNTER — DOCUMENTATION ONLY (OUTPATIENT)
Dept: OPHTHALMOLOGY | Facility: CLINIC | Age: 32
End: 2022-07-04
Payer: MEDICARE

## 2022-07-04 NOTE — PROGRESS NOTES
Called patient to confirm appointment at 9 am. Patient did not answer. Left message to inform patient that she needs to present to [facility] ED for examination.

## 2022-07-10 ENCOUNTER — HOSPITAL ENCOUNTER (EMERGENCY)
Facility: HOSPITAL | Age: 32
Discharge: HOME | End: 2022-07-10
Attending: STUDENT IN AN ORGANIZED HEALTH CARE EDUCATION/TRAINING PROGRAM
Payer: MEDICARE

## 2022-07-10 VITALS
TEMPERATURE: 99 F | HEART RATE: 89 BPM | SYSTOLIC BLOOD PRESSURE: 128 MMHG | BODY MASS INDEX: 25.52 KG/M2 | DIASTOLIC BLOOD PRESSURE: 80 MMHG | WEIGHT: 149.5 LBS | RESPIRATION RATE: 20 BRPM | HEIGHT: 64 IN | OXYGEN SATURATION: 100 %

## 2022-07-10 DIAGNOSIS — H16.012 CENTRAL CORNEAL ULCER OF LEFT EYE: Primary | ICD-10-CM

## 2022-07-10 PROCEDURE — 99283 EMERGENCY DEPT VISIT LOW MDM: CPT

## 2022-07-10 PROCEDURE — 25000003 PHARM REV CODE 250: Performed by: STUDENT IN AN ORGANIZED HEALTH CARE EDUCATION/TRAINING PROGRAM

## 2022-07-10 RX ORDER — ONDANSETRON 4 MG/1
4 TABLET, ORALLY DISINTEGRATING ORAL
Status: COMPLETED | OUTPATIENT
Start: 2022-07-10 | End: 2022-07-10

## 2022-07-10 RX ORDER — HYDROCODONE BITARTRATE AND ACETAMINOPHEN 5; 325 MG/1; MG/1
1 TABLET ORAL
Status: COMPLETED | OUTPATIENT
Start: 2022-07-10 | End: 2022-07-10

## 2022-07-10 RX ORDER — MOXIFLOXACIN 5 MG/ML
1 SOLUTION/ DROPS OPHTHALMIC
Status: COMPLETED | OUTPATIENT
Start: 2022-07-10 | End: 2022-07-10

## 2022-07-10 RX ORDER — HYDROCODONE BITARTRATE AND ACETAMINOPHEN 5; 325 MG/1; MG/1
1 TABLET ORAL EVERY 6 HOURS PRN
Qty: 10 TABLET | Refills: 0 | OUTPATIENT
Start: 2022-07-10 | End: 2022-11-09

## 2022-07-10 RX ADMIN — ONDANSETRON 4 MG: 4 TABLET, ORALLY DISINTEGRATING ORAL at 10:07

## 2022-07-10 RX ADMIN — HYDROCODONE BITARTRATE AND ACETAMINOPHEN 1 TABLET: 5; 325 TABLET ORAL at 10:07

## 2022-07-10 RX ADMIN — MOXIFLOXACIN HYDROCHLORIDE 1 DROP: 5 SOLUTION/ DROPS OPHTHALMIC at 11:07

## 2022-07-11 NOTE — CONSULTS
Chief complaint/Reason for Consult: worsening left corneal ulcer     History of Present Illness: Marie Pierce is a 32 y.o. female who presents with increased pain in left eye.    Past Ocular Hx: as below    Current eye gtts: none      PMHx:  has a past medical history of Corneal ulcer and Hidradenitis.     PSurgHx:  has a past surgical history that includes Umbilical hernia repair; Dilation and curettage of uterus; Excision of hidradenitis; Axillary hidradenitis excision; and Inguinal hidradenitis excision.     Home Medications:   Prior to Admission medications    Medication Sig Start Date End Date Taking? Authorizing Provider   diphenhydrAMINE-acetaminophen (TYLENOL PM EXTRA STRENGTH)  mg Tab Take by mouth. 1/9/22   Historical Provider   HYDROcodone-acetaminophen (NORCO) 5-325 mg per tablet Take 1 tablet by mouth every 6 (six) hours as needed for Pain. 7/10/22   Marcelo Pal MD   moxifloxacin (VIGAMOX) 0.5 % ophthalmic solution Place 1 drop into the left eye 6 (six) times daily. for 14 days 6/29/22 7/13/22  Ezio Martínez MD   nabumetone (RELAFEN) 500 MG tablet Take 500 mg by mouth 2 (two) times daily as needed. 1/9/22   Historical Provider        Medications this encounter:    moxifloxacin  1 drop Left Eye ED 1 Time       Allergies: is allergic to iodinated contrast media, iodine, and tramadol.     Social Hx:  reports that she has been smoking. She has been smoking about 1.00 pack per day. She has never used smokeless tobacco. She reports previous alcohol use. She reports that she does not use drugs.     Family Hx: No family history of glaucoma. family history includes Diabetes in her maternal grandmother, maternal uncle, maternal uncle, maternal uncle, and paternal grandmother; Hypertension in her maternal aunt and maternal grandmother; No Known Problems in her brother, father, maternal grandfather, mother, paternal aunt, paternal grandfather, paternal uncle, and sister; Thyroid disease in her maternal  aunt.     ROS: Negative x 10 except for complaints as described in HPI; negative for fever, chills, weight loss, nausea, vomiting, diarrhea, shortness of breath, nasal discharge, cough, abdominal pain, dyspnea, difficulty moving arms and legs, confusion, dysuria, palpitations, or chest pain     Ocular examination/Dilated fundus examination:  Base Eye Exam     Visual Acuity (Snellen - Linear)       Right Left    Dist sc  LP            Slit Lamp and Fundus Exam     External Exam       Right Left    External  Normal          Slit Lamp Exam       Right Left    Lids/Lashes  mild edema UL and LL    Conjunctiva/Sclera  inf 1+ injection    Cornea  PKP with multiple loose sutures (see image) with NV to inf and temporal sutures, signficiant edema, eddy negative, opacified failed graft with protrusion    Anterior Chamber  shallow    Iris  IK touch, may be plugging    Lens  no view    Vitreous  no view                Assessment/Plan:   1. Corneal ulcer, left eye, with hypopyon  2. 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 Denies contact lens use.  - Initial corneal culture 3/27 NG. Corneal cultures obtained again 4/11/22 (aerobic/anaerobic, fungal, KOH, Gram stain, mycobacteria), NG  - Patient returned to ED 4/11/22 for worsening pain of left eye. Admitted for worsening corneal ulcer.  - PKP of left cornea performed 4/14/22 for impending perforation of corneal ulcer  - Host corneal specimen sent to pathology with additional testing for fungal elements  - Eddy negative 4/18/22, but with low IOP and very shallow AC  - Bandage contact lens placed 4/18/22  - patient was on fortified vancomycin and tobramycin q2hr while awake left eye and PF q.i.d. left eye as well.  Patient has not followed up since mid April. She has not taken any medication eye drops for over  a month.   Has had multiple appointments where patient either no showed or left before being seen by an MD.  Patient presents 6/29/22 emergently for concern of left eye.  She was found to have numerous loose sutures, failed graft, shallow AC, neovascularization to multiple loose sutures.  Offered to remove loose sutures to prevent infection, further neovascularization, and decreasd irritation.  Patient was upset by possible poor prognosis and did not wish to have a needle close to her eye.  She had refused suture removal.  Discussed risks and benefits of suture removal to include infection and irritation prevention.  Opportunity for questions provided.  All questions answered.  Patient did not wish to continue with suture removal.  Discussed placing prokera to promote preservation of cornea status.  Patient did not wish to wait for this to be done.  Again discussed risks and benefits with patient.  Patient opted to reschedule for 6/30/22 for placement of the graft.  Discussed importance of adherence to eye drops and followups.  - 6/30/22: discussed rba of prokera. Patient wished to proceed with prokera placement. prokera placed left eye. Patient re-presented to clinic same day with prokera in hand noting it fell out. Collagen shield and hard shield placed over area. Instructed patient strictly no rubbing or taking shield off. Discussed importance of starting antibiotic drops. Patient was not sure where we had sent them.    - patient showed up to ER Today 7/10/22 with increased pain in left eye. Exam otherwise unchanged, eddy negative around loose sutures  - Again discussed importance of follow-up in clinic.  - Start vigamox 6x daily in left eye only  - Follow-up in clinic on Tuesday  - No topical anesthetic use    Discussed with Dr. Micky Martínez MD  [facility] PGY-4

## 2022-07-11 NOTE — ED PROVIDER NOTES
Encounter Date: 7/10/2022       History     Chief Complaint   Patient presents with    Eye Problem     Pt arrives w c/o throbbing and burning pain in left eye along with white drainage from eye. Pt reports she has had no vision in left eye since eye sx around [place], stitches still in place. Saw eye doctor last week. Pt also c/o migraine.     32-year-old female presents to ED for left eye pain.  States she has had a worsening corneal ulcer since May. Has seen Ophthalmology here multiple times.  Per chart review she has been grossly noncompliant.  Has not follow-up in clinic, she has not returned their phone calls and she does not take any antibiotics or eye drops.  Was last seen in the clinic in may and recently started going back to clinic in the end of June.  Had a follow-up appointment on this 4th of this month which she did not go to. Pt states the left eye continues to hurt and is now causing her a mild headache.  Denies any fever, does have continued chronic left eye discharge drainage, grossly unchanged.  No right eye pain/involvement.  No worsening pain with extraocular movement, no periorbital swelling or redness.  Denies any focal or diffuse neuro deficits. 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  No other complaints or concerns at this time.        Review of patient's allergies indicates:   Allergen Reactions    Iodinated contrast media Hives and Itching    Iodine Hives and Itching    Tramadol Itching and Nausea And Vomiting     Past Medical History:   Diagnosis Date    Corneal ulcer     Hidradenitis      Past Surgical History:   Procedure Laterality Date    AXILLARY HIDRADENITIS EXCISION      DILATION AND CURETTAGE OF UTERUS      EXCISION OF HIDRADENITIS      INGUINAL HIDRADENITIS EXCISION      UMBILICAL HERNIA REPAIR       Family History   Problem Relation Age of Onset    No Known Problems Mother     No Known Problems Father     No Known Problems Sister     No Known Problems  Brother     Hypertension Maternal Aunt     Thyroid disease Maternal Aunt     Diabetes Maternal Uncle     No Known Problems Paternal Aunt     No Known Problems Paternal Uncle     Diabetes Maternal Grandmother     Hypertension Maternal Grandmother     No Known Problems Maternal Grandfather     Diabetes Paternal Grandmother     No Known Problems Paternal Grandfather     Diabetes Maternal Uncle     Diabetes Maternal Uncle     Amblyopia Neg Hx     Blindness Neg Hx     Cancer Neg Hx     Cataracts Neg Hx     Glaucoma Neg Hx     Macular degeneration Neg Hx     Retinal detachment Neg Hx     Strabismus Neg Hx     Stroke Neg Hx      Social History     Tobacco Use    Smoking status: Current Every Day Smoker     Packs/day: 1.00    Smokeless tobacco: Never Used   Substance Use Topics    Alcohol use: Not Currently    Drug use: Never     Review of Systems   Constitutional: Negative for chills, diaphoresis and fever.   HENT: Negative for congestion, rhinorrhea, sinus pain and sore throat.    Eyes: Positive for pain, discharge and visual disturbance. Negative for photophobia, redness and itching.   Respiratory: Negative for cough, chest tightness and shortness of breath.    Cardiovascular: Negative for chest pain and palpitations.   Gastrointestinal: Negative for abdominal pain, nausea and vomiting.   Genitourinary: Negative for dysuria, flank pain and hematuria.   Musculoskeletal: Negative for back pain and myalgias.   Skin: Negative for color change and rash.   Neurological: Negative for dizziness, weakness and headaches.   Psychiatric/Behavioral: Negative for confusion. The patient is not hyperactive.        Physical Exam     Initial Vitals [07/10/22 2003]   BP Pulse Resp Temp SpO2   135/83 107 18 99 °F (37.2 °C) 100 %      MAP       --         Physical Exam    Vitals reviewed.  Constitutional: She appears well-developed and well-nourished. She is not diaphoretic. No distress.   HENT:   Head: Normocephalic  and atraumatic.   Eyes: EOM and lids are normal. Left eye exhibits discharge. Left eye exhibits no hordeolum. No foreign body present in the left eye. Left conjunctiva has no hemorrhage. No scleral icterus.   Please refer to ophthalmology note for detailed exam.  Agree with their findings.  Has now chronic ulceration of the left eye with fluorescence uptake. Has no useful vision in the affected eye.  Very mild scant drainage.  No pain with extraocular. No conentual photophobia. no evidence of periorbital infection or other acute findings.   Neck: Neck supple. No tracheal deviation present.   Normal range of motion.  Cardiovascular: Normal rate, regular rhythm, normal heart sounds and intact distal pulses.   Pulmonary/Chest: Breath sounds normal. No respiratory distress.   Abdominal: Abdomen is soft. There is no abdominal tenderness. There is no rebound and no guarding.   Musculoskeletal:         General: Normal range of motion.      Cervical back: Normal range of motion and neck supple.     Neurological: She is alert and oriented to person, place, and time. She has normal strength. GCS score is 15. GCS eye subscore is 4. GCS verbal subscore is 5. GCS motor subscore is 6.   Skin: Skin is warm and dry. Capillary refill takes less than 2 seconds. No rash noted.   Psychiatric: She has a normal mood and affect. Her behavior is normal. Judgment and thought content normal.         ED Course   Procedures  Labs Reviewed - No data to display       Imaging Results    None          Medications   HYDROcodone-acetaminophen 5-325 mg per tablet 1 tablet (1 tablet Oral Given 7/10/22 2203)   ondansetron disintegrating tablet 4 mg (4 mg Oral Given 7/10/22 2203)   moxifloxacin 0.5 % ophthalmic solution 1 drop (1 drop Left Eye Given 7/10/22 2300)     Medical Decision Making:   ED Management:  Upon patient arrival I consulted Ophthalmology.  They were in the department and we actively discussed this patient and reviewed her clinical  course. patient should be taking Vigamox multiple times daily and has been noncompliant. I did Rx a short course of pain medication.  No evidence at this time of worsening infection, no other eye involvement, no signs of pre or postseptal cellulitis, no evidence of trauma, etc. She was provided Vigamox drops in department and allowed to take it home. I absolutely did not allow her discharged with topical anesthetic eye drops used on exam. given very strict return precautions and I adamantly expressed need for patient to follow-up in eye clinic.  Voiced understanding and ultimately discharged stable.  Ophthalmology aware of and agreeable with plan. (dereje)                      Clinical Impression:   Final diagnoses:  [H16.012] Central corneal ulcer of left eye (Primary)          ED Disposition Condition    Discharge Stable        ED Prescriptions     Medication Sig Dispense Start Date End Date Auth. Provider    HYDROcodone-acetaminophen (NORCO) 5-325 mg per tablet Take 1 tablet by mouth every 6 (six) hours as needed for Pain. 10 tablet 7/10/2022  Marcelo Pal MD        Follow-up Information     Follow up With Specialties Details Why Contact Info    [facility] Emergency Medicine  As needed, If symptoms worsen [address] [facility] [address] [phone]    ophthalmology  Go in 2 days For wound re-check            Marcelo Pal MD  07/17/22 0975

## 2022-07-12 ENCOUNTER — TELEPHONE (OUTPATIENT)
Dept: OPHTHALMOLOGY | Facility: CLINIC | Age: 32
End: 2022-07-12

## 2022-07-12 ENCOUNTER — CLINICAL SUPPORT (OUTPATIENT)
Dept: OPHTHALMOLOGY | Facility: CLINIC | Age: 32
End: 2022-07-12
Payer: MEDICARE

## 2022-07-12 VITALS — WEIGHT: 149.5 LBS | BODY MASS INDEX: 25.52 KG/M2 | HEIGHT: 64 IN

## 2022-07-12 DIAGNOSIS — H16.012 CENTRAL CORNEAL ULCER, LEFT EYE: Primary | ICD-10-CM

## 2022-07-12 PROCEDURE — 99213 OFFICE O/P EST LOW 20 MIN: CPT | Mod: PBBFAC,PO | Performed by: STUDENT IN AN ORGANIZED HEALTH CARE EDUCATION/TRAINING PROGRAM

## 2022-07-12 RX ORDER — MOXIFLOXACIN 5 MG/ML
1 SOLUTION/ DROPS OPHTHALMIC
Status: COMPLETED | OUTPATIENT
Start: 2022-07-12 | End: 2022-07-12

## 2022-07-12 RX ADMIN — MOXIFLOXACIN 1 DROP: 5 SOLUTION/ DROPS OPHTHALMIC at 12:07

## 2022-07-12 NOTE — PROGRESS NOTES
HPI     Follow-up      Additional comments: Corneal ulcer OS              Comments     K check   Patient went to ED two days ago with increased pain in OS          Last edited by Marielle Johns MA on 7/12/2022 10:05 AM. (History)     HPI     Follow-up      Additional comments: Corneal ulcer OS              Comments     K check   Patient went to ED two days ago with increased pain in OS          Last edited by Marielle Johns MA on 7/12/2022 10:05 AM. (History)      Assessment /Plan     For exam results, see Encounter Report.    There are no diagnoses linked to this encounter.      B-scan   06/29/22: retina flat, no vitreous opacities noted     1. Corneal ulcer, left eye, with hypopyon  2. 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 Denies contact lens use.  - Initial corneal culture 3/27 NG. Corneal cultures obtained again 4/11/22 (aerobic/anaerobic, fungal, KOH, Gram stain, mycobacteria), NG  - Patient returned to ED 4/11/22 for worsening pain of left eye. Admitted for worsening corneal ulcer.  - PKP of left cornea performed 4/14/22 for impending perforation of corneal ulcer  - Host corneal specimen sent to pathology with additional testing for fungal elements  - Wilma negative 4/18/22, but with low IOP and very shallow AC  - Bandage contact lens placed 4/18/22  - patient was on fortified vancomycin and tobramycin q2hr while awake left eye and PF q.i.d. left eye as well.  Patient has not followed up since mid April. She has not taken any medication eye drops for over a month.   Has had multiple appointments where patient either no showed or left before being seen by an MD.  Patient presents 6/29/22 emergently for concern of left eye.  She was found to have numerous loose sutures, failed graft, shallow AC, neovascularization to multiple loose  sutures.  Offered to remove loose sutures to prevent infection, further neovascularization, and decreasd irritation.  Patient was upset by possible poor prognosis and did not wish to have a needle close to her eye.  She had refused suture removal.  Discussed risks and benefits of suture removal to include infection and irritation prevention.  Opportunity for questions provided.  All questions answered.  Patient did not wish to continue with suture removal.  Discussed placing prokera to promote preservation of cornea status.  Patient did not wish to wait for this to be done.  Again discussed risks and benefits with patient.  Patient opted to reschedule for 6/30/22 for placement of the graft.  Discussed importance of adherence to eye drops and followups.  - 6/30/22: discussed rba of prokera. Patient wished to proceed with prokera placement. prokera placed left eye. Patient re-presented to clinic same day with prokera in hand noting it fell out. Collagen shield and hard shield placed over area. Instructed patient strictly no rubbing or taking shield off. Discussed importance of starting antibiotic drops. Patient was not sure where we had sent them.    - 7/12/22 patient missed multiple follow ups. VA stable, graft with NV, loose sutures, central ulceration with protrusion. Attempted collagen shield placement but would not stick.  - continue vigamox 6x per day os, will follow up 7/13/22 with Dr. Vargas in [place]

## 2022-07-12 NOTE — TELEPHONE ENCOUNTER
07/12/2022 2:23 PM  Spoke with patient to let her about appointment tomorrow with Dr. Vargas at [facility] in [place] tomorrow at 2:20 pm at [address]. She expressed understanding.

## 2022-11-09 ENCOUNTER — HOSPITAL ENCOUNTER (EMERGENCY)
Facility: HOSPITAL | Age: 32
Discharge: HOME | End: 2022-11-09
Attending: FAMILY MEDICINE
Payer: MEDICARE

## 2022-11-09 VITALS
OXYGEN SATURATION: 100 % | BODY MASS INDEX: 28.75 KG/M2 | DIASTOLIC BLOOD PRESSURE: 97 MMHG | HEIGHT: 64 IN | TEMPERATURE: 98 F | WEIGHT: 168.38 LBS | SYSTOLIC BLOOD PRESSURE: 155 MMHG | RESPIRATION RATE: 16 BRPM | HEART RATE: 105 BPM

## 2022-11-09 DIAGNOSIS — H16.002 CORNEAL ULCER OF LEFT EYE: Primary | ICD-10-CM

## 2022-11-09 PROCEDURE — 99284 EMERGENCY DEPT VISIT MOD MDM: CPT

## 2022-11-09 RX ORDER — HYDROCODONE BITARTRATE AND ACETAMINOPHEN 7.5; 325 MG/1; MG/1
1 TABLET ORAL EVERY 6 HOURS PRN
Qty: 16 TABLET | Refills: 0 | Status: SHIPPED | OUTPATIENT
Start: 2022-11-09 | End: 2022-11-13

## 2022-11-09 RX ORDER — MOXIFLOXACIN 5 MG/ML
1 SOLUTION/ DROPS OPHTHALMIC EVERY 4 HOURS
Qty: 4 ML | Refills: 0 | Status: SHIPPED | OUTPATIENT
Start: 2022-11-09 | End: 2022-11-19

## 2022-11-10 NOTE — ED PROVIDER NOTES
Encounter Date: 11/9/2022       History     Chief Complaint   Patient presents with    Eye Pain     C/o eye pain and swelling. States ongoing since March since corneal replacement sx. Has been treated w/antibiotics in the past.      32-year-old female with history of a left corneal ulcer diagnosed in March of this year presents with ongoing left eye pain.  Patient has a history of noncompliance and signing out against medical advice.  She was previously seen by [facility].  Per last note on 7/10/2022, patient was to follow-up in their clinic and also with an ophthalmologist in [place].  Patient did not follow-up because of transportation issues.  Patient states she has absolutely no vision out of her left eye.  She denies recent trauma.  Denies fever or drainage.  She simply came to the ED today for pain control.  Patient tells me that she has read online that her vision may return after 1 year.  That she is being told too many things by different doctors and does not want anymore procedures or treatment for her eye at this time.  No other complaints.    Review of patient's allergies indicates:   Allergen Reactions    Iodinated contrast media Hives and Itching    Iodine Hives and Itching    Tramadol Itching and Nausea And Vomiting     Past Medical History:   Diagnosis Date    Corneal ulcer     Hidradenitis      Past Surgical History:   Procedure Laterality Date    AXILLARY HIDRADENITIS EXCISION      DILATION AND CURETTAGE OF UTERUS      EXCISION OF HIDRADENITIS      INGUINAL HIDRADENITIS EXCISION      UMBILICAL HERNIA REPAIR       Family History   Problem Relation Age of Onset    No Known Problems Mother     No Known Problems Father     No Known Problems Sister     No Known Problems Brother     Hypertension Maternal Aunt     Thyroid disease Maternal Aunt     Diabetes Maternal Uncle     No Known Problems Paternal Aunt     No Known Problems Paternal Uncle     Diabetes Maternal Grandmother     Hypertension  Maternal Grandmother     No Known Problems Maternal Grandfather     Diabetes Paternal Grandmother     No Known Problems Paternal Grandfather     Diabetes Maternal Uncle     Diabetes Maternal Uncle     Amblyopia Neg Hx     Blindness Neg Hx     Cancer Neg Hx     Cataracts Neg Hx     Glaucoma Neg Hx     Macular degeneration Neg Hx     Retinal detachment Neg Hx     Strabismus Neg Hx     Stroke Neg Hx      Social History     Tobacco Use    Smoking status: Every Day     Packs/day: 1.00     Types: Cigarettes    Smokeless tobacco: Never   Substance Use Topics    Alcohol use: Not Currently    Drug use: Never     Review of Systems   Constitutional: Negative.    HENT: Negative.     Eyes:  Positive for pain.   Respiratory: Negative.     Cardiovascular: Negative.    Gastrointestinal: Negative.    Endocrine: Negative.    Genitourinary: Negative.    Musculoskeletal: Negative.    Skin: Negative.    Allergic/Immunologic: Negative.    Neurological: Negative.    Hematological: Negative.    Psychiatric/Behavioral: Negative.       Physical Exam     Initial Vitals [11/09/22 1811]   BP Pulse Resp Temp SpO2   (!) 155/97 105 16 97.9 °F (36.6 °C) 100 %      MAP       --         Physical Exam    Nursing note and vitals reviewed.  Constitutional: She appears well-developed and well-nourished.   HENT:   Head: Normocephalic and atraumatic.   Eyes: EOM are normal. Left eye exhibits no chemosis, no discharge, no exudate and no hordeolum. No foreign body present in the left eye. Left conjunctiva is injected.   Complete opacification of the left cornea. Opacified failed graft with protrusion.  Sutures in place.   Neck: Neck supple.   Normal range of motion.  Cardiovascular:  Normal rate and regular rhythm.           Pulmonary/Chest: Breath sounds normal.   Abdominal: Abdomen is soft.   Musculoskeletal:         General: Normal range of motion.      Cervical back: Normal range of motion and neck supple.     Neurological: She is alert and oriented to  person, place, and time. She has normal strength.   Skin: Skin is warm. Capillary refill takes less than 2 seconds.   Psychiatric: She has a normal mood and affect.       ED Course   Procedures  Labs Reviewed - No data to display       Imaging Results    None          Medications - No data to display  Medical Decision Making:   History:   Old Records Summarized: records from clinic visits, records from previous admission(s) and records from another hospital.  ED Management:  Patient is nontoxic-appearing in no acute distress.  Vital signs stable.  Complete corneal opacification on PE.  Patient with no vision out of the left eye which is chronic.  No recent trauma fever or drainage.  No complaints from the right eye.  Patient requesting [place] for pain and antibiotic eyedrops because she is concerned that the infection in her left eye may spread to her right eye.  I strongly encouraged her to call [facility] ophthalmology clinic as soon as possible for further evaluation.  That not following up would certainly not improve her vision (despite what she read online) or overall outcome.  Strict return to ER precautions given, patient voiced understanding.                        Clinical Impression:   Final diagnoses:  [H16.002] Corneal ulcer of left eye (Primary)        ED Disposition Condition    Discharge Stable          ED Prescriptions       Medication Sig Dispense Start Date End Date Auth. Provider    HYDROcodone-acetaminophen (NORCO) 7.5-325 mg per tablet Take 1 tablet by mouth every 6 (six) hours as needed for Pain. 16 tablet 11/9/2022 11/13/2022 Amadou Nunez MD    moxifloxacin (VIGAMOX) 0.5 % ophthalmic solution Place 1 drop into the left eye every 4 (four) hours. for 10 days 4 mL 11/9/2022 11/19/2022 Amadou Nunez MD          Follow-up Information       Follow up With Specialties Details Why Contact Info    [facility] ophthalmology clinic  In 1 day               Amadou Nunez MD  11/10/22 8714

## 2022-12-07 ENCOUNTER — HOSPITAL ENCOUNTER (EMERGENCY)
Facility: HOSPITAL | Age: 32
Discharge: HOME | End: 2022-12-07
Attending: STUDENT IN AN ORGANIZED HEALTH CARE EDUCATION/TRAINING PROGRAM
Payer: MEDICARE

## 2022-12-07 VITALS
HEIGHT: 64 IN | WEIGHT: 165 LBS | OXYGEN SATURATION: 100 % | HEART RATE: 99 BPM | RESPIRATION RATE: 20 BRPM | DIASTOLIC BLOOD PRESSURE: 85 MMHG | TEMPERATURE: 99 F | BODY MASS INDEX: 28.17 KG/M2 | SYSTOLIC BLOOD PRESSURE: 140 MMHG

## 2022-12-07 DIAGNOSIS — H16.012 CENTRAL CORNEAL ULCER OF LEFT EYE: Primary | ICD-10-CM

## 2022-12-07 DIAGNOSIS — H20.052 HYPOPYON OF LEFT EYE: ICD-10-CM

## 2022-12-07 LAB
ALBUMIN SERPL-MCNC: 3.7 GM/DL (ref 3.5–5)
ALBUMIN/GLOB SERPL: 0.9 RATIO (ref 1.1–2)
ALP SERPL-CCNC: 70 UNIT/L (ref 40–150)
ALT SERPL-CCNC: 14 UNIT/L (ref 0–55)
AST SERPL-CCNC: 21 UNIT/L (ref 5–34)
B-HCG SERPL QL: NEGATIVE
BASOPHILS # BLD AUTO: 0.02 X10(3)/MCL (ref 0–0.2)
BASOPHILS NFR BLD AUTO: 0.3 %
BILIRUBIN DIRECT+TOT PNL SERPL-MCNC: 0.2 MG/DL
BUN SERPL-MCNC: 5.6 MG/DL (ref 7–18.7)
CALCIUM SERPL-MCNC: 9.5 MG/DL (ref 8.4–10.2)
CHLORIDE SERPL-SCNC: 109 MMOL/L (ref 98–107)
CO2 SERPL-SCNC: 23 MMOL/L (ref 22–29)
CREAT SERPL-MCNC: 0.81 MG/DL (ref 0.55–1.02)
EOSINOPHIL # BLD AUTO: 0.1 X10(3)/MCL (ref 0–0.9)
EOSINOPHIL NFR BLD AUTO: 1.5 %
ERYTHROCYTE [DISTWIDTH] IN BLOOD BY AUTOMATED COUNT: 16.3 % (ref 11.5–17)
GFR SERPLBLD CREATININE-BSD FMLA CKD-EPI: >60 MLS/MIN/1.73/M2
GLOBULIN SER-MCNC: 4.1 GM/DL (ref 2.4–3.5)
GLUCOSE SERPL-MCNC: 74 MG/DL (ref 74–100)
HCT VFR BLD AUTO: 34.4 % (ref 37–47)
HGB BLD-MCNC: 11.2 GM/DL (ref 12–16)
IMM GRANULOCYTES # BLD AUTO: 0.01 X10(3)/MCL (ref 0–0.04)
IMM GRANULOCYTES NFR BLD AUTO: 0.2 %
LYMPHOCYTES # BLD AUTO: 2.54 X10(3)/MCL (ref 0.6–4.6)
LYMPHOCYTES NFR BLD AUTO: 39.1 %
MCH RBC QN AUTO: 28.9 PG (ref 27–31)
MCHC RBC AUTO-ENTMCNC: 32.6 MG/DL (ref 33–36)
MCV RBC AUTO: 88.7 FL (ref 80–94)
MONOCYTES # BLD AUTO: 0.7 X10(3)/MCL (ref 0.1–1.3)
MONOCYTES NFR BLD AUTO: 10.8 %
NEUTROPHILS # BLD AUTO: 3.1 X10(3)/MCL (ref 2.1–9.2)
NEUTROPHILS NFR BLD AUTO: 48.1 %
NRBC BLD AUTO-RTO: 0 %
PLATELET # BLD AUTO: 424 X10(3)/MCL (ref 130–400)
PMV BLD AUTO: 9.9 FL (ref 7.4–10.4)
POTASSIUM SERPL-SCNC: 3.8 MMOL/L (ref 3.5–5.1)
PROT SERPL-MCNC: 7.8 GM/DL (ref 6.4–8.3)
RBC # BLD AUTO: 3.88 X10(6)/MCL (ref 4.2–5.4)
SODIUM SERPL-SCNC: 140 MMOL/L (ref 136–145)
WBC # SPEC AUTO: 6.5 X10(3)/MCL (ref 4.5–11.5)

## 2022-12-07 PROCEDURE — 85025 COMPLETE CBC W/AUTO DIFF WBC: CPT | Performed by: STUDENT IN AN ORGANIZED HEALTH CARE EDUCATION/TRAINING PROGRAM

## 2022-12-07 PROCEDURE — 81025 URINE PREGNANCY TEST: CPT | Performed by: STUDENT IN AN ORGANIZED HEALTH CARE EDUCATION/TRAINING PROGRAM

## 2022-12-07 PROCEDURE — 80053 COMPREHEN METABOLIC PANEL: CPT | Performed by: STUDENT IN AN ORGANIZED HEALTH CARE EDUCATION/TRAINING PROGRAM

## 2022-12-07 PROCEDURE — 99284 EMERGENCY DEPT VISIT MOD MDM: CPT | Mod: 25

## 2022-12-07 PROCEDURE — 25000003 PHARM REV CODE 250: Performed by: STUDENT IN AN ORGANIZED HEALTH CARE EDUCATION/TRAINING PROGRAM

## 2022-12-07 RX ORDER — HYDROCODONE BITARTRATE AND ACETAMINOPHEN 5; 325 MG/1; MG/1
1 TABLET ORAL
Status: COMPLETED | OUTPATIENT
Start: 2022-12-07 | End: 2022-12-07

## 2022-12-07 RX ORDER — TETRACAINE HYDROCHLORIDE 5 MG/ML
2 SOLUTION OPHTHALMIC
Status: COMPLETED | OUTPATIENT
Start: 2022-12-07 | End: 2022-12-07

## 2022-12-07 RX ORDER — TOBRAMYCIN 3 MG/ML
1 SOLUTION/ DROPS OPHTHALMIC 4 TIMES DAILY
Qty: 5 ML | Refills: 0 | Status: SHIPPED | OUTPATIENT
Start: 2022-12-07 | End: 2022-12-12

## 2022-12-07 RX ADMIN — TETRACAINE HYDROCHLORIDE 2 DROP: 5 SOLUTION OPHTHALMIC at 02:12

## 2022-12-07 RX ADMIN — HYDROCODONE BITARTRATE AND ACETAMINOPHEN 1 TABLET: 5; 325 TABLET ORAL at 03:12

## 2022-12-07 NOTE — ED TRIAGE NOTES
Pt to er with left eye pain. Pt states that she has been having pain since corneal replacement sx in april

## 2022-12-07 NOTE — ED PROVIDER NOTES
Encounter Date: 12/7/2022       History     Chief Complaint   Patient presents with    Eye Pain     HPI    32-year-old female presents emergency department with left eye pain.  Patient had a corneal ulcer and April which ended up having the have a corneal transplant.  This ultimately failed and patient had extremely poor follow-up.  She was seen at [facility] on 07/10 for similar problems and was seen by ophthalmology.  Patient was then seen back in emergency department 11/10 it has still not followed up with Ophthalmology.  She states she is waiting to see a ophthalmologist in [place] because she does not trust the eye doctors in this area.  States that she has no vision in the left eye and the left eye causes pain.  No pain with eye movement.  She denies any fevers.  Per chart review patient is grossly noncompliant with follow-up and medical treatment thus far with follow-up regarding her left eye.  Her last ophthalmology appointment seems to be on 07/12.      Review of patient's allergies indicates:   Allergen Reactions    Iodinated contrast media Hives and Itching    Iodine Hives and Itching    Tramadol Itching and Nausea And Vomiting     Past Medical History:   Diagnosis Date    Corneal ulcer     Hidradenitis      Past Surgical History:   Procedure Laterality Date    AXILLARY HIDRADENITIS EXCISION      DILATION AND CURETTAGE OF UTERUS      EXCISION OF HIDRADENITIS      INGUINAL HIDRADENITIS EXCISION      UMBILICAL HERNIA REPAIR       Family History   Problem Relation Age of Onset    No Known Problems Mother     No Known Problems Father     No Known Problems Sister     No Known Problems Brother     Hypertension Maternal Aunt     Thyroid disease Maternal Aunt     Diabetes Maternal Uncle     No Known Problems Paternal Aunt     No Known Problems Paternal Uncle     Diabetes Maternal Grandmother     Hypertension Maternal Grandmother     No Known Problems Maternal Grandfather     Diabetes Paternal Grandmother     No  Known Problems Paternal Grandfather     Diabetes Maternal Uncle     Diabetes Maternal Uncle     Amblyopia Neg Hx     Blindness Neg Hx     Cancer Neg Hx     Cataracts Neg Hx     Glaucoma Neg Hx     Macular degeneration Neg Hx     Retinal detachment Neg Hx     Strabismus Neg Hx     Stroke Neg Hx      Social History     Tobacco Use    Smoking status: Every Day     Packs/day: 1.00     Types: Cigarettes    Smokeless tobacco: Never   Substance Use Topics    Alcohol use: Not Currently    Drug use: Never     Review of Systems   Constitutional:  Negative for fever.   Eyes:  Positive for pain, discharge and visual disturbance. Negative for redness.   Respiratory:  Negative for cough and shortness of breath.    Cardiovascular:  Negative for chest pain.   Gastrointestinal:  Negative for abdominal pain.   Neurological:  Positive for headaches.   All other systems reviewed and are negative.    Physical Exam     Initial Vitals [12/07/22 0212]   BP Pulse Resp Temp SpO2   (!) 140/85 99 20 98.5 °F (36.9 °C) 100 %      MAP       --         Physical Exam    Nursing note and vitals reviewed.  Constitutional: She appears well-developed and well-nourished. No distress.   Eyes:   Left eye with corneal completely opacify fainted and protruding.  Per chart review as well as imaging this seems stable.  No discharge.  No conjunctival injection.  Patient has no vision in the eye.  See images below.  Right eye with a pressure of 12.5 mmHg; left eye with a pressure of 8.5 mmHg   Cardiovascular:  Normal rate and regular rhythm.           Pulmonary/Chest: Breath sounds normal. No respiratory distress.   Abdominal: Abdomen is soft. Bowel sounds are normal. There is no abdominal tenderness.   Musculoskeletal:         General: No tenderness. Normal range of motion.     Neurological: She is alert and oriented to person, place, and time.   Skin: Skin is warm. Capillary refill takes less than 2 seconds.   Psychiatric: She has a normal mood and affect.  Thought content normal.                 ED Course   Procedures  Labs Reviewed   COMPREHENSIVE METABOLIC PANEL - Abnormal; Notable for the following components:       Result Value    Chloride 109 (*)     Blood Urea Nitrogen 5.6 (*)     Globulin 4.1 (*)     Albumin/Globulin Ratio 0.9 (*)     All other components within normal limits   CBC WITH DIFFERENTIAL - Abnormal; Notable for the following components:    RBC 3.88 (*)     Hgb 11.2 (*)     Hct 34.4 (*)     MCHC 32.6 (*)     Platelet 424 (*)     All other components within normal limits   PREGNANCY TEST, URINE RAPID - Normal   CBC W/ AUTO DIFFERENTIAL    Narrative:     The following orders were created for panel order CBC auto differential.  Procedure                               Abnormality         Status                     ---------                               -----------         ------                     CBC with Differential[[id number]]        Abnormal            Final result                 Please view results for these tests on the individual orders.          Imaging Results              CT Orbits Sella Post Fossa Without Cont (Preliminary result)  Result time 12/07/22 04:16:52      Preliminary result by Myke Olvera MD (12/07/22 04:16:52)                   Narrative:    START OF REPORT:  Technique: Maxillofacial CT including orbits was performed without intravenous contrast with axial as well as sagittal and coronal images.    Comparison: None.    Clinical history: Pt had surgery on her left eye 04/22; did not do any follow up care; left eye pain and swelling; pt allergic to IV contrast.    Findings:  Facial soft tissues: Unremarkable.  Orbital soft tissues: The orbital soft tissues appear unremarkable. Both globes appear unremarkable. The extraocular muscles appear unremarkable bilaterally. No intraorbital fat stranding is seen.  Bones: No osseous abnormality is seen.  Orbital bony structures: The bilateral orbital bony structures are intact with no  orbital fracture identified.  Mandible: The mandible appears unremarkable.  Maxilla: The maxilla appears unremarkable.  Zygoma: The zygomatic arches are intact.  TMJ: The mandibular condyles appear normally placed with respect to the mandibular fossa.  Nasal Bones: There is mild deviation of the nasal septum to the left with a bony septal spur. There is a maria eugenia bullosa in the right middle turbinate. There is hypertrophy of the right inferior turbinate.  Paranasal sinuses: The visualized paranasal sinuses appear clear with no significant mucoperiosteal thickening or air fluid levels identified.  Mastoid air cells: The visualized mastoid air cells appear clear.      Impression:  1. Unremarkable non-contrast maxillofacial CT including orbits. No acute maxillofacial fracture identified. Details as above.                          Preliminary result by M-DAQ, Rad Results In (12/07/22 04:16:52)                   Narrative:    START OF REPORT:  Technique: Maxillofacial CT including orbits was performed without intravenous contrast with axial as well as sagittal and coronal images.    Comparison: None.    Clinical history: Pt had surgery on her left eye 04/22; did not do any follow up care; left eye pain and swelling; pt allergic to IV contrast.    Findings:  Facial soft tissues: Unremarkable.  Orbital soft tissues: The orbital soft tissues appear unremarkable. Both globes appear unremarkable. The extraocular muscles appear unremarkable bilaterally. No intraorbital fat stranding is seen.  Bones: No osseous abnormality is seen.  Orbital bony structures: The bilateral orbital bony structures are intact with no orbital fracture identified.  Mandible: The mandible appears unremarkable.  Maxilla: The maxilla appears unremarkable.  Zygoma: The zygomatic arches are intact.  TMJ: The mandibular condyles appear normally placed with respect to the mandibular fossa.  Nasal Bones: There is mild deviation of the nasal septum to the  left with a bony septal spur. There is a maria eugenia bullosa in the right middle turbinate. There is hypertrophy of the right inferior turbinate.  Paranasal sinuses: The visualized paranasal sinuses appear clear with no significant mucoperiosteal thickening or air fluid levels identified.  Mastoid air cells: The visualized mastoid air cells appear clear.      Impression:  1. Unremarkable non-contrast maxillofacial CT including orbits. No acute maxillofacial fracture identified. Details as above.                                         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  She understands generally have Ophthalmology here and only wants pain medication.  I will obtain a CT of her orbits to rule out possible worsening etiologies are possible intra ocular infection.  I informed her that she needs to follow-up with ophthalmologist and has to stop putting off the care.  There is not much that can be done emergency department for this chronic issue.    PATIENT IS NONTOXIC APPEARING.  LABS WITHIN NORMAL LIMITS.  CT NEGATIVE.  PER CHART REVIEW I HAS THE SAME CHARACTERISTICS AS IT DID LAST TIME.  NO INDICATION FOR URGENT OPHTHALMOLOGIC CONSULTATION AS THIS BEEN ONGOING FOR LAST FEW MONTHS.  PATIENT UNDERSTANDS THE ABSOLUTE IMPORTANCE OF FOLLOWING UP WITH OPHTHALMOLOGY.  I WILL NOT GIVE PATIENT ANY PAIN MEDICINE WHATSOEVER.  SHE NEEDS TO FOLLOW-UP WITH OPHTHALMOLOGIST.  WE WILL GIVE HER SOME ANTIBIOTIC DROPS.                        Clinical Impression:   Final diagnoses:  [H16.012] Central corneal ulcer of left eye (Primary)  [H20.052] Hypopyon of left eye      ED Disposition  Condition    Discharge Stable          ED Prescriptions       Medication Sig Dispense Start Date End Date Auth. Provider    tobramycin sulfate 0.3% (TOBREX) 0.3 % ophthalmic solution Place 1 drop into the left eye 4 (four) times daily. for 5 days 5 mL 12/7/2022 12/12/2022 Shady Flores MD          Follow-up Information       Follow up With Specialties Details Why Contact Info    FOLLOW-UP WITH EYE DOCTOR  In 1 day      [facility] - Emergency Dept Emergency Medicine Go to  If symptoms worsen [id number]  [facility] [id number]  [phone]             Shady Flores MD  12/07/22 [id number]

## 2022-12-07 NOTE — DISCHARGE INSTRUCTIONS
FOLLOW-UP WITH OPHTHALMOLOGY AS SOON AS POSSIBLE.  YOU MAY LOSE YOUR EYE IF YOU DO NOT FOLLOW-UP WITH THEM.

## 2023-01-27 ENCOUNTER — HOSPITAL ENCOUNTER (EMERGENCY)
Facility: HOSPITAL | Age: 33
Discharge: HOME | End: 2023-01-27
Attending: EMERGENCY MEDICINE
Payer: MEDICARE

## 2023-01-27 VITALS
OXYGEN SATURATION: 100 % | SYSTOLIC BLOOD PRESSURE: 154 MMHG | TEMPERATURE: 99 F | HEART RATE: 90 BPM | RESPIRATION RATE: 18 BRPM | BODY MASS INDEX: 29.02 KG/M2 | DIASTOLIC BLOOD PRESSURE: 87 MMHG | WEIGHT: 170 LBS | HEIGHT: 64 IN

## 2023-01-27 DIAGNOSIS — H44.009 ENDOPHTHALMITIS, UNSPECIFIED LATERALITY: Primary | ICD-10-CM

## 2023-01-27 PROCEDURE — 63600175 PHARM REV CODE 636 W HCPCS: Performed by: EMERGENCY MEDICINE

## 2023-01-27 PROCEDURE — 25000003 PHARM REV CODE 250: Performed by: EMERGENCY MEDICINE

## 2023-01-27 PROCEDURE — 99284 EMERGENCY DEPT VISIT MOD MDM: CPT

## 2023-01-27 PROCEDURE — 96372 THER/PROPH/DIAG INJ SC/IM: CPT | Performed by: EMERGENCY MEDICINE

## 2023-01-27 RX ORDER — MORPHINE SULFATE 4 MG/ML
4 INJECTION, SOLUTION INTRAMUSCULAR; INTRAVENOUS
Status: COMPLETED | OUTPATIENT
Start: 2023-01-27 | End: 2023-01-27

## 2023-01-27 RX ORDER — TETRACAINE HYDROCHLORIDE 5 MG/ML
2 SOLUTION OPHTHALMIC
Status: COMPLETED | OUTPATIENT
Start: 2023-01-27 | End: 2023-01-27

## 2023-01-27 RX ORDER — ONDANSETRON 4 MG/1
4 TABLET, ORALLY DISINTEGRATING ORAL
Status: COMPLETED | OUTPATIENT
Start: 2023-01-27 | End: 2023-01-27

## 2023-01-27 RX ADMIN — ONDANSETRON 4 MG: 4 TABLET, ORALLY DISINTEGRATING ORAL at 07:01

## 2023-01-27 RX ADMIN — MORPHINE SULFATE 4 MG: 4 INJECTION INTRAVENOUS at 09:01

## 2023-01-27 RX ADMIN — TETRACAINE HYDROCHLORIDE 2 DROP: 5 SOLUTION OPHTHALMIC at 07:01

## 2023-01-27 RX ADMIN — MORPHINE SULFATE 4 MG: 4 INJECTION INTRAVENOUS at 07:01
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
## 2023-06-20 ENCOUNTER — HOSPITAL ENCOUNTER (EMERGENCY)
Facility: HOSPITAL | Age: 33
Discharge: HOME | End: 2023-06-20
Attending: EMERGENCY MEDICINE
Payer: MEDICARE

## 2023-06-20 VITALS
RESPIRATION RATE: 18 BRPM | HEART RATE: 90 BPM | OXYGEN SATURATION: 97 % | SYSTOLIC BLOOD PRESSURE: 135 MMHG | TEMPERATURE: 99 F | DIASTOLIC BLOOD PRESSURE: 86 MMHG

## 2023-06-20 DIAGNOSIS — A59.9 TRICHOMONAS INFECTION: ICD-10-CM

## 2023-06-20 DIAGNOSIS — R10.30 LOWER ABDOMINAL PAIN: ICD-10-CM

## 2023-06-20 DIAGNOSIS — N39.0 URINARY TRACT INFECTION WITHOUT HEMATURIA, SITE UNSPECIFIED: Primary | ICD-10-CM

## 2023-06-20 LAB
ALBUMIN SERPL-MCNC: 3.9 G/DL (ref 3.5–5)
ALBUMIN/GLOB SERPL: 1 RATIO (ref 1.1–2)
ALP SERPL-CCNC: 61 UNIT/L (ref 40–150)
ALT SERPL-CCNC: 11 UNIT/L (ref 0–55)
APPEARANCE UR: ABNORMAL
AST SERPL-CCNC: 18 UNIT/L (ref 5–34)
B-HCG FREE SERPL-ACNC: <2.42 MIU/ML
B-HCG SERPL QL: NEGATIVE
BACTERIA #/AREA URNS AUTO: ABNORMAL /HPF
BASOPHILS # BLD AUTO: 0.02 X10(3)/MCL
BASOPHILS NFR BLD AUTO: 0.3 %
BILIRUB UR QL STRIP.AUTO: NEGATIVE MG/DL
BILIRUBIN DIRECT+TOT PNL SERPL-MCNC: 0.2 MG/DL
BUN SERPL-MCNC: 7.5 MG/DL (ref 7–18.7)
CALCIUM SERPL-MCNC: 9.6 MG/DL (ref 8.4–10.2)
CHLORIDE SERPL-SCNC: 111 MMOL/L (ref 98–107)
CO2 SERPL-SCNC: 20 MMOL/L (ref 22–29)
COLOR UR: YELLOW
CREAT SERPL-MCNC: 0.83 MG/DL (ref 0.55–1.02)
EOSINOPHIL # BLD AUTO: 0.1 X10(3)/MCL (ref 0–0.9)
EOSINOPHIL NFR BLD AUTO: 1.6 %
ERYTHROCYTE [DISTWIDTH] IN BLOOD BY AUTOMATED COUNT: 14.3 % (ref 11.5–17)
GFR SERPLBLD CREATININE-BSD FMLA CKD-EPI: >60 MLS/MIN/1.73/M2
GLOBULIN SER-MCNC: 3.9 GM/DL (ref 2.4–3.5)
GLUCOSE SERPL-MCNC: 92 MG/DL (ref 74–100)
GLUCOSE UR QL STRIP.AUTO: NEGATIVE MG/DL
HCT VFR BLD AUTO: 33.6 % (ref 37–47)
HGB BLD-MCNC: 11.8 G/DL (ref 12–16)
IMM GRANULOCYTES # BLD AUTO: 0.02 X10(3)/MCL (ref 0–0.04)
IMM GRANULOCYTES NFR BLD AUTO: 0.3 %
KETONES UR QL STRIP.AUTO: NEGATIVE MG/DL
LEUKOCYTE ESTERASE UR QL STRIP.AUTO: ABNORMAL UNIT/L
LIPASE SERPL-CCNC: 11 U/L
LYMPHOCYTES # BLD AUTO: 3.15 X10(3)/MCL (ref 0.6–4.6)
LYMPHOCYTES NFR BLD AUTO: 49.5 %
MCH RBC QN AUTO: 31.3 PG (ref 27–31)
MCHC RBC AUTO-ENTMCNC: 35.1 G/DL (ref 33–36)
MCV RBC AUTO: 89.1 FL (ref 80–94)
MONOCYTES # BLD AUTO: 0.55 X10(3)/MCL (ref 0.1–1.3)
MONOCYTES NFR BLD AUTO: 8.6 %
MUCOUS THREADS URNS QL MICRO: ABNORMAL /LPF
NEUTROPHILS # BLD AUTO: 2.52 X10(3)/MCL (ref 2.1–9.2)
NEUTROPHILS NFR BLD AUTO: 39.7 %
NITRITE UR QL STRIP.AUTO: POSITIVE
NRBC BLD AUTO-RTO: 0 %
PH UR STRIP.AUTO: 6 [PH]
PLATELET # BLD AUTO: 439 X10(3)/MCL (ref 130–400)
PMV BLD AUTO: 10.1 FL (ref 7.4–10.4)
POTASSIUM SERPL-SCNC: 3.2 MMOL/L (ref 3.5–5.1)
PROT SERPL-MCNC: 7.8 GM/DL (ref 6.4–8.3)
PROT UR QL STRIP.AUTO: 30 MG/DL
RBC # BLD AUTO: 3.77 X10(6)/MCL (ref 4.2–5.4)
RBC #/AREA URNS AUTO: ABNORMAL /HPF
RBC UR QL AUTO: ABNORMAL UNIT/L
SODIUM SERPL-SCNC: 140 MMOL/L (ref 136–145)
SP GR UR STRIP.AUTO: >=1.03
SQUAMOUS #/AREA URNS AUTO: ABNORMAL /HPF
T VAGINALIS URNS QL MICRO: ABNORMAL /HPF
UROBILINOGEN UR STRIP-ACNC: 0.2 MG/DL
WBC # SPEC AUTO: 6.36 X10(3)/MCL (ref 4.5–11.5)
WBC #/AREA URNS AUTO: ABNORMAL /HPF

## 2023-06-20 PROCEDURE — 99285 EMERGENCY DEPT VISIT HI MDM: CPT | Mod: 25

## 2023-06-20 PROCEDURE — 96374 THER/PROPH/DIAG INJ IV PUSH: CPT

## 2023-06-20 PROCEDURE — 25000003 PHARM REV CODE 250

## 2023-06-20 PROCEDURE — 84702 CHORIONIC GONADOTROPIN TEST: CPT

## 2023-06-20 PROCEDURE — 96375 TX/PRO/DX INJ NEW DRUG ADDON: CPT

## 2023-06-20 PROCEDURE — 85025 COMPLETE CBC W/AUTO DIFF WBC: CPT

## 2023-06-20 PROCEDURE — 80053 COMPREHEN METABOLIC PANEL: CPT

## 2023-06-20 PROCEDURE — 83690 ASSAY OF LIPASE: CPT

## 2023-06-20 PROCEDURE — 81001 URINALYSIS AUTO W/SCOPE: CPT

## 2023-06-20 PROCEDURE — 87077 CULTURE AEROBIC IDENTIFY: CPT

## 2023-06-20 PROCEDURE — 81025 URINE PREGNANCY TEST: CPT

## 2023-06-20 PROCEDURE — 87088 URINE BACTERIA CULTURE: CPT

## 2023-06-20 PROCEDURE — 63600175 PHARM REV CODE 636 W HCPCS

## 2023-06-20 PROCEDURE — 87186 SC STD MICRODIL/AGAR DIL: CPT

## 2023-06-20 RX ORDER — PHENAZOPYRIDINE HYDROCHLORIDE 100 MG/1
100 TABLET, FILM COATED ORAL
Status: COMPLETED | OUTPATIENT
Start: 2023-06-20 | End: 2023-06-20

## 2023-06-20 RX ORDER — ONDANSETRON 2 MG/ML
4 INJECTION INTRAMUSCULAR; INTRAVENOUS
Status: COMPLETED | OUTPATIENT
Start: 2023-06-20 | End: 2023-06-20

## 2023-06-20 RX ORDER — ONDANSETRON 4 MG/1
4 TABLET, ORALLY DISINTEGRATING ORAL EVERY 6 HOURS PRN
Qty: 30 TABLET | Refills: 0 | Status: SHIPPED | OUTPATIENT
Start: 2023-06-20

## 2023-06-20 RX ORDER — CEPHALEXIN 500 MG/1
500 CAPSULE ORAL EVERY 12 HOURS
Qty: 14 CAPSULE | Refills: 0 | Status: SHIPPED | OUTPATIENT
Start: 2023-06-20 | End: 2023-06-27

## 2023-06-20 RX ORDER — METRONIDAZOLE 500 MG/1
500 TABLET ORAL EVERY 12 HOURS
Qty: 14 TABLET | Refills: 0 | Status: SHIPPED | OUTPATIENT
Start: 2023-06-20 | End: 2023-06-27

## 2023-06-20 RX ORDER — PHENAZOPYRIDINE HYDROCHLORIDE 100 MG/1
100 TABLET, FILM COATED ORAL 3 TIMES DAILY PRN
Qty: 30 TABLET | Refills: 0 | Status: SHIPPED | OUTPATIENT
Start: 2023-06-20 | End: 2023-06-30

## 2023-06-20 RX ORDER — MORPHINE SULFATE 4 MG/ML
4 INJECTION, SOLUTION INTRAMUSCULAR; INTRAVENOUS
Status: COMPLETED | OUTPATIENT
Start: 2023-06-20 | End: 2023-06-20

## 2023-06-20 RX ADMIN — ONDANSETRON 4 MG: 2 INJECTION INTRAMUSCULAR; INTRAVENOUS at 12:06

## 2023-06-20 RX ADMIN — MORPHINE SULFATE 4 MG: 4 INJECTION INTRAVENOUS at 03:06

## 2023-06-20 RX ADMIN — PHENAZOPYRIDINE HYDROCHLORIDE 100 MG: 100 TABLET ORAL at 03:06

## 2023-06-20 RX ADMIN — SODIUM CHLORIDE, POTASSIUM CHLORIDE, SODIUM LACTATE AND CALCIUM CHLORIDE 1000 ML: 600; 310; 30; 20 INJECTION, SOLUTION INTRAVENOUS at 12:06

## 2023-06-20 NOTE — Clinical Note
"Marie"Mohsen Pierce was seen and treated in our emergency department on 6/20/2023.  She may return to work on 06/22/2023.       If you have any questions or concerns, please don't hesitate to call.      Jose Gilman NP"

## 2023-06-20 NOTE — ED PROVIDER NOTES
Encounter Date: 6/20/2023       History     Chief Complaint   Patient presents with    Abdominal Pain     The patient is a 32 y.o. female who presents to the Emergency Department with a chief complaint of lower abdominal pain. Symptoms began 5 days ago and have been constant since onset. Her pain is currently rated as a 6/10 in severity and described as aching with no radiation. Associated symptoms include nausea, vomiting, and diarrhea. Patient states when pain started 5 days ago she thought it was associated with her menstrual cycle. Patient states that the symptoms continued after her menstrual cycle subsided. Symptoms are aggravated with eating/drinking and there are no alleviating factors. The patient denies chest pain, sob, fever, or chills. She reports taking nothing prior to arrival with no relief of symptoms. No other reported symptoms at this time.      The history is provided by the patient. No  was used.   Abdominal Pain  The current episode started several days ago. The onset of the illness was gradual. The problem has not changed since onset.The abdominal pain is located in the periumbilical region, LLQ and RLQ. The abdominal pain does not radiate. The severity of the abdominal pain is 6/10. The abdominal pain is relieved by nothing. The abdominal pain is exacerbated by vomiting, bowel movements and eating. The other symptoms of the illness include nausea, vomiting and diarrhea. The other symptoms of the illness do not include fever, shortness of breath or dysuria.   The diarrhea began 3 to 5 days ago. The diarrhea is semi-solid. The diarrhea occurs 2 - 4 times per day.   Nausea began 3 to 5 days ago. The nausea is associated with eating. The nausea is exacerbated by food.   The vomiting began more than 2 days ago. Vomiting occurs 2 to 5 times per day. The emesis contains stomach contents.   Symptoms associated with the illness do not include urgency, frequency or back pain.    Review of patient's allergies indicates:   Allergen Reactions    Iodinated contrast media Hives and Itching    Iodine Hives and Itching    Tramadol Itching and Nausea And Vomiting     Past Medical History:   Diagnosis Date    Corneal ulcer     Hidradenitis      Past Surgical History:   Procedure Laterality Date    AXILLARY HIDRADENITIS EXCISION      DILATION AND CURETTAGE OF UTERUS      EXCISION OF HIDRADENITIS      INGUINAL HIDRADENITIS EXCISION      UMBILICAL HERNIA REPAIR       Family History   Problem Relation Age of Onset    No Known Problems Mother     No Known Problems Father     No Known Problems Sister     No Known Problems Brother     Hypertension Maternal Aunt     Thyroid disease Maternal Aunt     Diabetes Maternal Uncle     No Known Problems Paternal Aunt     No Known Problems Paternal Uncle     Diabetes Maternal Grandmother     Hypertension Maternal Grandmother     No Known Problems Maternal Grandfather     Diabetes Paternal Grandmother     No Known Problems Paternal Grandfather     Diabetes Maternal Uncle     Diabetes Maternal Uncle     Amblyopia Neg Hx     Blindness Neg Hx     Cancer Neg Hx     Cataracts Neg Hx     Glaucoma Neg Hx     Macular degeneration Neg Hx     Retinal detachment Neg Hx     Strabismus Neg Hx     Stroke Neg Hx      Social History     Tobacco Use    Smoking status: Every Day     Packs/day: 1.00     Types: Cigarettes    Smokeless tobacco: Never   Substance Use Topics    Alcohol use: Not Currently    Drug use: Never     Review of Systems   Constitutional:  Negative for fever.   HENT:  Negative for sore throat.    Respiratory:  Negative for shortness of breath.    Cardiovascular:  Negative for chest pain.   Gastrointestinal:  Positive for abdominal pain, diarrhea, nausea and vomiting.   Genitourinary:  Negative for dysuria, frequency and urgency.   Musculoskeletal:  Negative for back pain.   Skin:  Negative for rash.   Neurological:  Negative for weakness.   Hematological:  Does not  bruise/bleed easily.   All other systems reviewed and are negative.    Physical Exam     Initial Vitals [06/20/23 1204]   BP Pulse Resp Temp SpO2   135/87 90 20 99.1 °F (37.3 °C) 97 %      MAP       --         Physical Exam    Nursing note and vitals reviewed.  Constitutional: She appears well-developed and well-nourished.   HENT:   Head: Normocephalic.   Right Ear: Hearing and tympanic membrane normal.   Left Ear: Hearing and tympanic membrane normal.   Mouth/Throat: Uvula is midline, oropharynx is clear and moist and mucous membranes are normal.   Eyes: Right pupil is round and reactive.   Opacified left cornea    Cardiovascular:  Normal rate, regular rhythm, normal heart sounds and normal pulses.           Pulmonary/Chest: Effort normal and breath sounds normal.   Abdominal: Abdomen is soft. Bowel sounds are normal. There is abdominal tenderness in the right lower quadrant, periumbilical area and left lower quadrant.     Lymphadenopathy:     She has no cervical adenopathy.   Neurological: She is alert. GCS eye subscore is 4. GCS verbal subscore is 5. GCS motor subscore is 6.   Skin: Skin is warm, dry and intact. Capillary refill takes less than 2 seconds.       ED Course   Procedures  Labs Reviewed   COMPREHENSIVE METABOLIC PANEL - Abnormal; Notable for the following components:       Result Value    Potassium Level 3.2 (*)     Chloride 111 (*)     Carbon Dioxide 20 (*)     Globulin 3.9 (*)     Albumin/Globulin Ratio 1.0 (*)     All other components within normal limits   URINALYSIS, REFLEX TO URINE CULTURE - Abnormal; Notable for the following components:    Appearance, UA SL CLOUDY (*)     Protein, UA 30 (*)     Blood, UA Large (*)     Nitrites, UA Positive (*)     Leukocyte Esterase, UA Small (*)     All other components within normal limits   CBC WITH DIFFERENTIAL - Abnormal; Notable for the following components:    RBC 3.77 (*)     Hgb 11.8 (*)     Hct 33.6 (*)     MCH 31.3 (*)     Platelet 439 (*)     All  other components within normal limits   URINALYSIS, MICROSCOPIC - Abnormal; Notable for the following components:    Bacteria, UA Moderate (*)     Mucous, UA Trace (*)     Trichomonas, UA Few (*)     RBC, UA 21-50 (*)     WBC, UA 21-50 (*)     Squamous Epithelial Cells, UA Many (*)     All other components within normal limits   LIPASE - Normal   PREGNANCY TEST, URINE RAPID - Normal   CULTURE, URINE   CBC W/ AUTO DIFFERENTIAL    Narrative:     The following orders were created for panel order CBC Auto Differential.  Procedure                               Abnormality         Status                     ---------                               -----------         ------                     CBC with Differential[[id number]]        Abnormal            Final result                 Please view results for these tests on the individual orders.   RAINBOW DRAW    Narrative:     The following orders were created for panel order [place] Draw.  Procedure                               Abnormality         Status                     ---------                               -----------         ------                     Light Blue Top Hold[[id number]]                              In process                 Gold Top Hold[[id number]]                                    In process                   Please view results for these tests on the individual orders.   LIGHT BLUE TOP HOLD   GOLD TOP HOLD   HCG, QUANTITATIVE          Imaging Results              CT Abdomen Pelvis  Without Contrast (Final result)  Result time 06/20/23 16:00:03      Final result by Saad Guerra MD (06/20/23 16:00:03)                   Impression:      Small less than 1 cm lymph nodes scattered in the mesentery possibly representing mesenteric adenitis    Otherwise unremarkable      Electronically signed by: Saad Guerra  Date:    06/20/2023  Time:    16:00               Narrative:    EXAMINATION:  CT ABDOMEN PELVIS WITHOUT CONTRAST    CLINICAL  HISTORY:  Abdominal pain, acute, nonlocalized;    TECHNIQUE:  Low dose axial images, sagittal and coronal reformations were obtained from the lung bases to the pubic symphysis.  No contrast was administered.  Automatic exposure control is utilized to reduce patient radiation exposure.    COMPARISON:  A 08/02/2020    FINDINGS:  The lung bases are clear.    The liver appears normal.  No obvious liver mass or lesion is seen.    Gallbladder appears normal.  No gallstones are seen.    The pancreas appears normal.  No inflammatory changes are seen in the pancreatic region.    The spleen appears normal and adrenal glands appear normal.  No adrenal nodule is seen.    No nephrolithiasis is seen.  No hydronephrosis is seen.  No hydroureter is seen.  No ureteral stone is seen.    No colitis is seen.  No diverticulitis is seen.  No appendicitis is seen.  The appendix is seen in the pelvis.    There are multiple punctate less than 1 cm lymph nodes seen scattered in the mesentery.  Findings could represent mesenteric adenitis.    No free air seen.  No free fluid is seen.  The urinary bladder appears normal.    Bones show no acute abnormality.                                       Medications   lactated ringers bolus 1,000 mL (1,000 mLs Intravenous New Bag 6/20/23 1226)   ondansetron injection 4 mg (4 mg Intravenous Given 6/20/23 1226)   morphine injection 4 mg (4 mg Intravenous Given 6/20/23 1520)   phenazopyridine tablet 100 mg (100 mg Oral Given 6/20/23 1526)     Medical Decision Making:   Initial Assessment:   The patient is a 32 y.o. female who presents to the Emergency Department with a chief complaint of lower abdominal pain. Symptoms began 5 days ago and have been constant since onset. Her pain is currently rated as a 6/10 in severity and described as aching with no radiation. Associated symptoms include nausea, vomiting, and diarrhea. Patient states when pain started 5 days ago she thought it was associated with her  menstrual cycle. Patient states that the symptoms continued after her menstrual cycle subsided. Symptoms are aggravated with eating/drinking and there are no alleviating factors. The patient denies chest pain, sob, fever, or chills. She reports taking nothing prior to arrival with no relief of symptoms. No other reported symptoms at this time.    Differential Diagnosis:   Differential diagnosis includes but are not limited to gastroenteritis, urinary tract infection, diverticulitis, colitis, pregnancy, cholelithiasis, cholecystitis   Clinical Tests:   Lab Tests: Reviewed and Ordered  Radiological Study: Ordered and Reviewed  ED Management:  MDM  History obtained by patient.   Co-morbidities and/or factors adding to the complexity or risk for the patient?: none  Differential diagnoses: Differential diagnosis includes but are not limited to gastroenteritis, urinary tract infection, diverticulitis, colitis, pregnancy, cholelithiasis, cholecystitis   Decision to obtain previous or outside records?: no  Chart Review (nursing home, outside records, CareEverywhere): none  Review of RX medications/new RX prescribed by me?: keflex, zofran, pyridium  My EKG Interpretation: see above  Labs/imaging/other tests obtained/considered (risk/benefits of testing discussed): CBC,CMP, UA, UPT, CT abdomen  Labs/tests intepretation:  Labs unremarkable; UA with moderate bacteria, 21-50 white blood cells, 1+ leukocyte; CT shows possible mesenteric adenitis but otherwise unremarkable CT scan of abdomen  My independent imaging interpretation: none  Treatment/interventions, IV fluids, IV medications:  IV fluids, Zofran, Pyridium, morphine  Complex management (IV controlled substances, went to OR, DNR, meds requiring monitoring, transfer, etc)?:  None  Workup/treatment affected by social determinants of health?: none   Point of care US done/interpretation: none  Consults/radiologist/EMS/social work/family discussion/alternate history:  none  Advanced care planning/end of life discussion: none  Shared decision making: Shared decision making with patient.  ETOH/smoking/drug cessation discussion: N/A  Dispo:  Discharge home.  I have discussed results in detail with the patient including follow up.  He states that pain has improved.  Will discharge patient with strict return precautions.            ED Course as of 06/20/23 1617   Tue Jun 20, 2023   1508 Patient continues to have pain. UA consistent with UTI. WBC normal but will add CT to rule out any other abdominal abnormality  [LM]   1530 NITRITE UA(!): Positive [LM]   1530 WBC, UA(!): 21-50 [LM]   1530 Bacteria, UA(!): Moderate [LM]   1530 Trichomonas, UA(!): Few [LM]   1609 CT scan shows possible mesenteric adenitis, otherwise normal CT scan of abdomen and pelvis.  I have discussed results in detail with the patient including follow up.  I will send her home on antibiotics for her urinary tract infection and Pyridium to help with bladder spasms. [LM]      ED Course User Index  [LM] Jose Gilman NP                 Clinical Impression:   Final diagnoses:  [N39.0] Urinary tract infection without hematuria, site unspecified (Primary)  [R10.30] Lower abdominal pain  [A59.9] Trichomonas infection        ED Disposition Condition    Discharge Stable          ED Prescriptions       Medication Sig Dispense Start Date End Date Auth. Provider    cephALEXin (KEFLEX) 500 MG capsule Take 1 capsule (500 mg total) by mouth every 12 (twelve) hours. for 7 days 14 capsule 6/20/2023 6/27/2023 Jose Gilman NP    phenazopyridine (PYRIDIUM) 100 MG tablet Take 1 tablet (100 mg total) by mouth 3 (three) times daily as needed for Pain. 30 tablet 6/20/2023 6/30/2023 Jose Gilman NP    ondansetron (ZOFRAN-ODT) 4 MG TbDL Take 1 tablet (4 mg total) by mouth every 6 (six) hours as needed (Nausea). 30 tablet 6/20/2023 -- Jose Gilman NP    metroNIDAZOLE (FLAGYL) 500 MG tablet Take 1 tablet (500 mg total) by  mouth every 12 (twelve) hours. for 7 days 14 tablet 6/20/2023 6/27/2023 Jose Gilman NP          Follow-up Information       Follow up With Specialties Details Why Contact Info    Primary care provider  Schedule an appointment as soon as possible for a visit                Jose Gilman NP  06/20/23 5728

## 2023-06-29 LAB — MAYO GENERIC ORDERABLE RESULT: ABNORMAL

## 2023-06-29 RX ORDER — NITROFURANTOIN 25; 75 MG/1; MG/1
100 CAPSULE ORAL 2 TIMES DAILY
Qty: 10 CAPSULE | Refills: 0 | Status: SHIPPED | OUTPATIENT
Start: 2023-06-29 | End: 2023-07-04

## 2023-06-30 LAB — BACTERIA UR CULT: ABNORMAL

## 2023-06-30 NOTE — PROGRESS NOTES
UTI resistant to Keflex.  Prescription for Macrobid sent.  Inform patient to stop Keflex and start Macrobid.